# Patient Record
Sex: FEMALE | Race: WHITE | NOT HISPANIC OR LATINO | Employment: FULL TIME
[De-identification: names, ages, dates, MRNs, and addresses within clinical notes are randomized per-mention and may not be internally consistent; named-entity substitution may affect disease eponyms.]

---

## 2017-01-12 DIAGNOSIS — E11.65 TYPE 2 DIABETES MELLITUS WITH HYPERGLYCEMIA, WITHOUT LONG-TERM CURRENT USE OF INSULIN (H): Primary | ICD-10-CM

## 2017-01-12 NOTE — TELEPHONE ENCOUNTER
Routing refill request to provider for review/approval because:  Labs out of range:  A1c, was advised to be seen per letter.  No appts.

## 2017-01-12 NOTE — TELEPHONE ENCOUNTER
glipiZIDE         Last Written Prescription Date: 10/10/16  Last Fill Quantity: 270, # refills: 0  Last Office Visit with G, P or [facility] prescribing provider:  10/20/16      BP Readings from Last 3 Encounters:   10/20/16 114/72   03/04/16 104/72   02/02/15 122/62     MICROL        8   11/1/2016  No results found for this basename: microalbumin  CREATININE   Date Value Ref Range Status   11/01/2016 0.52 0.52 - 1.04 mg/dL Final   ]  GFR ESTIMATE   Date Value Ref Range Status   11/01/2016 >90  Non  GFR Calc   >60 mL/min/1.7m2 Final   03/04/2016 >90  Non  GFR Calc   >60 mL/min/1.7m2 Final   02/04/2015 >90  Non  GFR Calc   >60 mL/min/1.7m2 Final     GFR ESTIMATE IF BLACK   Date Value Ref Range Status   11/01/2016 >90   GFR Calc   >60 mL/min/1.7m2 Final   03/04/2016 >90   GFR Calc   >60 mL/min/1.7m2 Final   02/04/2015 >90   GFR Calc   >60 mL/min/1.7m2 Final     CHOL      205   11/1/2016  HDL       32   11/1/2016  LDL      116   11/1/2016  TRIG      283   11/1/2016  CHOLHDLRATIO      5.0   2/4/2015  AST       11   11/1/2016  ALT       15   11/1/2016  A1C     10.8   11/1/2016  A1C     12.6   3/4/2016  A1C     10.6   2/4/2015  A1C     13.3   5/7/2014  A1C     11.7   4/4/2013  POTASSIUM   Date Value Ref Range Status   11/01/2016 4.3 3.4 - 5.3 mmol/L Final

## 2017-01-13 RX ORDER — GLIPIZIDE 5 MG/1
TABLET ORAL
Qty: 270 TABLET | Refills: 0 | Status: SHIPPED | OUTPATIENT
Start: 2017-01-13 | End: 2017-04-20

## 2017-04-20 DIAGNOSIS — E11.65 TYPE 2 DIABETES MELLITUS WITH HYPERGLYCEMIA, WITHOUT LONG-TERM CURRENT USE OF INSULIN (H): ICD-10-CM

## 2017-04-20 RX ORDER — GLIPIZIDE 5 MG/1
TABLET ORAL
Qty: 270 TABLET | Refills: 0 | Status: SHIPPED | OUTPATIENT
Start: 2017-04-20 | End: 2017-07-24

## 2017-04-20 NOTE — TELEPHONE ENCOUNTER
glipiZIDE (GLUCOTROL) 5 MG tablet         Last Written Prescription Date: 1/13/2017  Last Fill Quantity: 270, # refills: 0  Last Office Visit with [facility], [facility] or [facility] prescribing provider:  10/20/2016        BP Readings from Last 3 Encounters:   10/20/16 114/72   03/04/16 104/72   02/02/15 122/62     Lab Results   Component Value Date    MICROL 8 11/01/2016     Lab Results   Component Value Date    UMALCR 5.71 11/01/2016     Creatinine   Date Value Ref Range Status   11/01/2016 0.52 0.52 - 1.04 mg/dL Final   ]  GFR Estimate   Date Value Ref Range Status   11/01/2016 >90  Non  GFR Calc   >60 mL/min/1.7m2 Final   03/04/2016 >90  Non  GFR Calc   >60 mL/min/1.7m2 Final   02/04/2015 >90  Non  GFR Calc   >60 mL/min/1.7m2 Final     GFR Estimate If Black   Date Value Ref Range Status   11/01/2016 >90   GFR Calc   >60 mL/min/1.7m2 Final   03/04/2016 >90   GFR Calc   >60 mL/min/1.7m2 Final   02/04/2015 >90   GFR Calc   >60 mL/min/1.7m2 Final     Lab Results   Component Value Date    CHOL 205 11/01/2016     Lab Results   Component Value Date    HDL 32 11/01/2016     Lab Results   Component Value Date     11/01/2016     Lab Results   Component Value Date    TRIG 283 11/01/2016     Lab Results   Component Value Date    CHOLHDLRATIO 5.0 02/04/2015     Lab Results   Component Value Date    AST 11 11/01/2016     Lab Results   Component Value Date    ALT 15 11/01/2016     Lab Results   Component Value Date    A1C 10.8 11/01/2016    A1C 12.6 03/04/2016    A1C 10.6 02/04/2015    A1C 13.3 05/07/2014    A1C 11.7 04/04/2013     Potassium   Date Value Ref Range Status   11/01/2016 4.3 3.4 - 5.3 mmol/L Final

## 2017-06-03 ENCOUNTER — HEALTH MAINTENANCE LETTER (OUTPATIENT)
Age: 37
End: 2017-06-03

## 2017-07-24 DIAGNOSIS — E11.65 TYPE 2 DIABETES MELLITUS WITH HYPERGLYCEMIA, WITHOUT LONG-TERM CURRENT USE OF INSULIN (H): ICD-10-CM

## 2017-07-25 NOTE — TELEPHONE ENCOUNTER
glipiZIDE (GLUCOTROL) 5 MG tablet         Last Written Prescription Date: 4/20/2017  Last Fill Quantity: 270, # refills: 0  Last Office Visit with [facility], [facility] or [facility] prescribing provider:  10/20/2016        BP Readings from Last 3 Encounters:   10/20/16 114/72   03/04/16 104/72   02/02/15 122/62     Lab Results   Component Value Date    MICROL 8 11/01/2016     Lab Results   Component Value Date    UMALCR 5.71 11/01/2016     Creatinine   Date Value Ref Range Status   11/01/2016 0.52 0.52 - 1.04 mg/dL Final   ]  GFR Estimate   Date Value Ref Range Status   11/01/2016 >90  Non  GFR Calc   >60 mL/min/1.7m2 Final   03/04/2016 >90  Non  GFR Calc   >60 mL/min/1.7m2 Final   02/04/2015 >90  Non  GFR Calc   >60 mL/min/1.7m2 Final     GFR Estimate If Black   Date Value Ref Range Status   11/01/2016 >90   GFR Calc   >60 mL/min/1.7m2 Final   03/04/2016 >90   GFR Calc   >60 mL/min/1.7m2 Final   02/04/2015 >90   GFR Calc   >60 mL/min/1.7m2 Final     Lab Results   Component Value Date    CHOL 205 11/01/2016     Lab Results   Component Value Date    HDL 32 11/01/2016     Lab Results   Component Value Date     11/01/2016     Lab Results   Component Value Date    TRIG 283 11/01/2016     Lab Results   Component Value Date    CHOLHDLRATIO 5.0 02/04/2015     Lab Results   Component Value Date    AST 11 11/01/2016     Lab Results   Component Value Date    ALT 15 11/01/2016     Lab Results   Component Value Date    A1C 10.8 11/01/2016    A1C 12.6 03/04/2016    A1C 10.6 02/04/2015    A1C 13.3 05/07/2014    A1C 11.7 04/04/2013     Potassium   Date Value Ref Range Status   11/01/2016 4.3 3.4 - 5.3 mmol/L Final

## 2017-07-25 NOTE — TELEPHONE ENCOUNTER
Routing refill request to provider for review/approval because:  Labs out of range:  LDL  Patient needs to be seen because:  More than 3 months since last office visit.  Last seen 10/2016 and based on HgbA1C was due 1/2017.

## 2017-07-28 RX ORDER — GLIPIZIDE 5 MG/1
TABLET ORAL
Qty: 270 TABLET | Refills: 0 | Status: SHIPPED | OUTPATIENT
Start: 2017-07-28 | End: 2019-05-29

## 2017-09-12 ENCOUNTER — TELEPHONE (OUTPATIENT)
Dept: INTERNAL MEDICINE | Facility: CLINIC | Age: 37
End: 2017-09-12

## 2017-09-12 NOTE — LETTER
[facility]  [address] [place] [address]  [phone]  September 12, 2017    Salima Woodward  [address] [facility] [address]    Dear Salima,    I care about your health and have reviewed your health plan. I have reviewed your medical conditions, medication list, and lab results and am making recommendations based on this review, to better manage your health.    You are in particular need of attention regarding:  -Diabetes  -Cervical Cancer Screening    I am recommending that you:     -schedule a FOLLOWUP OFFICE APPOINTMENT with me.      -schedule a PAP SMEAR EXAM which is due.  Please disregard this reminder if you have had this exam elsewhere within the last year.  It would be helpful for us to have a copy of your recent pap smear report in our file so that we can best coordinate your care.    If you are under/uninsured, we recommend you contact the [facility]. They offer pap smears at no charge or on a sliding fee charge. You can schedule with them at [phone]. Please have them send us the results.      Here is a list of Health Maintenance topics that are due now or due soon:  Health Maintenance Due   Topic Date Due     Eye Exam - yearly  02/22/1981     Pap Smear - every 3 years  02/22/2001     Diabetic Foot Exam - yearly  03/04/2017     A1C (Diabetes) Lab - every 6 months  05/01/2017     Flu Vaccine - yearly  09/01/2017       Please call us at [phone] or [phone] (or use Shout) to address the above recommendations.     Thank you for trusting [facility].  We appreciate the opportunity to serve you and look forward to supporting your healthcare needs in the future.    If you have (or plan to have) any of these tests done at a facility other than a [facility] or a [facility], please have the results from these tests sent to your primary physician at [facility]..    Healthy  Regards,    Ancelmo Holbrook MD

## 2017-11-30 ENCOUNTER — OFFICE VISIT (OUTPATIENT)
Dept: INTERNAL MEDICINE | Facility: CLINIC | Age: 37
End: 2017-11-30
Payer: COMMERCIAL

## 2017-11-30 VITALS
SYSTOLIC BLOOD PRESSURE: 114 MMHG | DIASTOLIC BLOOD PRESSURE: 70 MMHG | TEMPERATURE: 98.7 F | WEIGHT: 135 LBS | HEART RATE: 94 BPM | OXYGEN SATURATION: 96 % | BODY MASS INDEX: 24.3 KG/M2

## 2017-11-30 DIAGNOSIS — D35.2 PITUITARY MICROADENOMA (H): ICD-10-CM

## 2017-11-30 DIAGNOSIS — E11.65 TYPE 2 DIABETES MELLITUS WITH HYPERGLYCEMIA, WITHOUT LONG-TERM CURRENT USE OF INSULIN (H): ICD-10-CM

## 2017-11-30 DIAGNOSIS — Z23 NEED FOR PROPHYLACTIC VACCINATION AND INOCULATION AGAINST INFLUENZA: ICD-10-CM

## 2017-11-30 LAB
ALBUMIN SERPL-MCNC: 3.5 G/DL (ref 3.4–5)
ALP SERPL-CCNC: 92 U/L (ref 40–150)
ALT SERPL W P-5'-P-CCNC: 15 U/L (ref 0–50)
ANION GAP SERPL CALCULATED.3IONS-SCNC: 5 MMOL/L (ref 3–14)
AST SERPL W P-5'-P-CCNC: 20 U/L (ref 0–45)
BILIRUB SERPL-MCNC: 0.3 MG/DL (ref 0.2–1.3)
BUN SERPL-MCNC: 3 MG/DL (ref 7–30)
CALCIUM SERPL-MCNC: 9.2 MG/DL (ref 8.5–10.1)
CHLORIDE SERPL-SCNC: 108 MMOL/L (ref 94–109)
CHOLEST SERPL-MCNC: 192 MG/DL
CO2 SERPL-SCNC: 30 MMOL/L (ref 20–32)
CREAT SERPL-MCNC: 0.43 MG/DL (ref 0.52–1.04)
FSH SERPL-ACNC: 3.6 IU/L
GFR SERPL CREATININE-BSD FRML MDRD: >90 ML/MIN/1.7M2
GLUCOSE SERPL-MCNC: 211 MG/DL (ref 70–99)
HBA1C MFR BLD: 12.1 % (ref 4.3–6)
HDLC SERPL-MCNC: 33 MG/DL
LDLC SERPL CALC-MCNC: 111 MG/DL
LH SERPL-ACNC: 2.1 IU/L
NONHDLC SERPL-MCNC: 159 MG/DL
POTASSIUM SERPL-SCNC: 4.1 MMOL/L (ref 3.4–5.3)
PROLACTIN SERPL-MCNC: 2 UG/L (ref 3–27)
PROT SERPL-MCNC: 7.6 G/DL (ref 6.8–8.8)
SODIUM SERPL-SCNC: 143 MMOL/L (ref 133–144)
TRIGL SERPL-MCNC: 240 MG/DL
TSH SERPL DL<=0.005 MIU/L-ACNC: 1.38 MU/L (ref 0.4–4)

## 2017-11-30 PROCEDURE — 36415 COLL VENOUS BLD VENIPUNCTURE: CPT | Performed by: INTERNAL MEDICINE

## 2017-11-30 PROCEDURE — 99214 OFFICE O/P EST MOD 30 MIN: CPT | Mod: 25 | Performed by: INTERNAL MEDICINE

## 2017-11-30 PROCEDURE — 90686 IIV4 VACC NO PRSV 0.5 ML IM: CPT | Performed by: INTERNAL MEDICINE

## 2017-11-30 PROCEDURE — 90471 IMMUNIZATION ADMIN: CPT | Performed by: INTERNAL MEDICINE

## 2017-11-30 PROCEDURE — 80061 LIPID PANEL: CPT | Performed by: INTERNAL MEDICINE

## 2017-11-30 PROCEDURE — 84146 ASSAY OF PROLACTIN: CPT | Performed by: INTERNAL MEDICINE

## 2017-11-30 PROCEDURE — 83002 ASSAY OF GONADOTROPIN (LH): CPT | Performed by: INTERNAL MEDICINE

## 2017-11-30 PROCEDURE — 83001 ASSAY OF GONADOTROPIN (FSH): CPT | Performed by: INTERNAL MEDICINE

## 2017-11-30 PROCEDURE — 83036 HEMOGLOBIN GLYCOSYLATED A1C: CPT | Performed by: INTERNAL MEDICINE

## 2017-11-30 PROCEDURE — 82043 UR ALBUMIN QUANTITATIVE: CPT | Performed by: INTERNAL MEDICINE

## 2017-11-30 PROCEDURE — 80053 COMPREHEN METABOLIC PANEL: CPT | Performed by: INTERNAL MEDICINE

## 2017-11-30 PROCEDURE — 84443 ASSAY THYROID STIM HORMONE: CPT | Performed by: INTERNAL MEDICINE

## 2017-11-30 RX ORDER — GLIMEPIRIDE 4 MG/1
TABLET ORAL
Qty: 180 TABLET | Refills: 1 | Status: SHIPPED | OUTPATIENT
Start: 2017-11-30 | End: 2018-05-29

## 2017-11-30 NOTE — NURSING NOTE
"Chief Complaint   Patient presents with     Diabetes       Initial /70  Pulse 94  Temp 98.7  F (37.1  C) (Oral)  Wt 135 lb (61.2 kg)  SpO2 96%  BMI 24.3 kg/m2 Estimated body mass index is 24.3 kg/(m^2) as calculated from the following:    Height as of 3/4/16: 5' 2.5\" (1.588 m).    Weight as of this encounter: 135 lb (61.2 kg).  Medication Reconciliation: complete  "

## 2017-11-30 NOTE — MR AVS SNAPSHOT
After Visit Summary   11/30/2017    Salima Woodward    MRN: [id number]           Patient Information     Date Of Birth          1980        Visit Information        Provider Department      11/30/2017 3:00 PM Erlin Maldonado MD [facility]        Today's Diagnoses     Type 2 diabetes mellitus with hyperglycemia, without long-term current use of insulin (H)        Pituitary microadenoma (H)        Need for prophylactic vaccination and inoculation against influenza          Care Instructions     Stop Glipizide  Start Glimepiride 4mg tab, 2 tabs daily in AM with food for diabetes. If low blood sugars, then reduce to 1 tab daily in AM  Reduce carbohydrate (sugars, starchy foods such as bread, rice, potato, pasta, etc) intake  in your diet and increase the amount of color on your plate with fruits, vegetables and lean meats.   Eye exam  Flu vaccine   Would recommend pneumoccal vaccine at next appt.  Check blood sugars twice a day (before breakfast and bedtime) for 4 days and Email results to Dr Holbrook in 3 weeks and whether taking one or two tabs of Glimepiride             Follow-ups after your visit        Who to contact     If you have questions or need follow up information about today's clinic visit or your schedule please contact [facility] directly at [phone].  Normal or non-critical lab and imaging results will be communicated to you by MyChart, letter or phone within 4 business days after the clinic has received the results. If you do not hear from us within 7 days, please contact the clinic through MyChart or phone. If you have a critical or abnormal lab result, we will notify you by phone as soon as possible.  Submit refill requests through [employer] or call your pharmacy and they will forward the refill request to us. Please allow 3 business days for your refill to be completed.          Additional Information About Your Visit         [employer] Information     [employer] gives you secure access to your electronic health record. If you see a primary care provider, you can also send messages to your care team and make appointments. If you have questions, please call your primary care clinic.  If you do not have a primary care provider, please call [phone] and they will assist you.        Care EveryWhere ID     This is your Care EveryWhere ID. This could be used by other organizations to access your [place] medical records  [id number]        Your Vitals Were     Pulse Temperature Pulse Oximetry BMI (Body Mass Index)          94 98.7  F (37.1  C) (Oral) 96% 24.3 kg/m2         Blood Pressure from Last 3 Encounters:   11/30/17 114/70   10/20/16 114/72   03/04/16 104/72    Weight from Last 3 Encounters:   11/30/17 135 lb (61.2 kg)   10/20/16 138 lb (62.6 kg)   03/04/16 137 lb (62.1 kg)              We Performed the Following     Albumin Random Urine Quantitative with Creat Ratio     Comprehensive metabolic panel     FLU VAC, SPLIT VIRUS IM > 3 YO (QUADRIVALENT) [63274]     Follicle stimulating hormone     Hemoglobin A1c     Lipid panel reflex to direct LDL Fasting     Lutropin     Prolactin     TSH with free T4 reflex     Vaccine Administration, Initial [89858]          Today's Medication Changes          These changes are accurate as of: 11/30/17  8:42 PM.  If you have any questions, ask your nurse or doctor.               Start taking these medicines.        Dose/Directions    glimepiride 4 MG tablet   Commonly known as:  AMARYL   Used for:  Type 2 diabetes mellitus with hyperglycemia, without long-term current use of insulin (H)   Started by:  Erlin Maldonado MD        2 tabs in AM   Quantity:  180 tablet   Refills:  1         These medicines have changed or have updated prescriptions.        Dose/Directions    Calcium carb-Vitamin D 500 mg [place]-200 units 500-200 MG-UNIT per tablet   Commonly known as:  OSCAL with D;Oyster Shell Calcium   This may  have changed:  Another medication with the same name was removed. Continue taking this medication, and follow the directions you see here.   Changed by:  Erlin Maldonado MD        Dose:  1 tablet   Take 1 tablet by mouth 2 times daily (with meals)   Refills:  0            Where to get your medicines      These medications were sent to [facility] [address] [place] [address] [address] [facility]  [address] [facility] [address]     Phone:  [phone]     glimepiride 4 MG tablet                Primary Care Provider Office Phone # Fax #    Samm Ancelmo Holbrook [phone]       [address] [place] [id number]        Goals        General    I will  test my blood sugars twice a  day in the morning and evening  (pt-stated)     Notes - Note created  5/29/2014 10:05 AM by Yisel Iqbal, RN    As of today's date 5/29/2014 goal is met at 0 - 25%.   Goal Status:  Active      I will call [employer] and apply for health insurance  (pt-stated)     Notes - Note created  5/7/2014  9:51 AM by Yisel Iqbal, RN    As of today's date 5/7/2014 goal is met at 0 - 25%.   Goal Status:  Active      I will call [employer]/ Prescription Assistance Program and check on medication cost assistance  (pt-stated)     Notes - Note created  5/7/2014  9:55 AM by Yisel Iqbal, RN    As of today's date 5/7/2014 goal is met at 0 - 25%.   Goal Status:  Active        Equal Access to Services     [facility] AH: Hadii aad ku hadasho Soomaali, waaxda luqadaha, qaybta kaalmada adeegyada, waxay marlene urena . So [facility] [phone].    ATENCIÓN: Si habla español, tiene a call disposición servicios gratuitos de asistencia lingüística. Llame al [phone].    We comply with applicable federal civil rights laws and [place] laws. We do not discriminate on the basis of race, color, national origin, age, disability, sex, sexual orientation, or gender identity.            Thank you!      Thank you for choosing [facility]  for your care. Our goal is always to provide you with excellent care. Hearing back from our patients is one way we can continue to improve our services. Please take a few minutes to complete the written survey that you may receive in the mail after your visit with us. Thank you!             Your Updated Medication List - Protect others around you: Learn how to safely use, store and throw away your medicines at www.disposemymeds.org.          This list is accurate as of: 11/30/17  8:42 PM.  Always use your most recent med list.                   Brand Name Dispense Instructions for use Diagnosis    blood glucose monitoring test strip    EMILEE CONTOUR    100 each    Use to test blood sugars two times daily or as directed.    Type 2 diabetes mellitus with hyperglycemia, without long-term current use of insulin (H)       Calcium carb-Vitamin D 500 mg [place]-200 units 500-200 MG-UNIT per tablet    OSCAL with D;Oyster Shell Calcium     Take 1 tablet by mouth 2 times daily (with meals)        DAILY MULTIVITAMIN PO      Take  by mouth.        EPINEPHrine 0.3 MG/0.3ML injection 2-pack    EPIPEN/ADRENACLICK/or ANY BX GENERIC EQUIV    0.6 mL    Inject 0.3 mLs (0.3 mg) into the muscle once as needed    Type 2 diabetes mellitus with hyperglycemia, without long-term current use of insulin (H)       glimepiride 4 MG tablet    AMARYL    180 tablet    2 tabs in AM    Type 2 diabetes mellitus with hyperglycemia, without long-term current use of insulin (H)       glipiZIDE 5 MG tablet    GLUCOTROL    270 tablet    TAKE ONE & ONE-HALF TABLETS BY MOUTH TWICE DAILY    Type 2 diabetes mellitus with hyperglycemia, without long-term current use of insulin (H)       ibuprofen 800 MG tablet    ADVIL/MOTRIN    90 tablet    Take 1 tablet (800 mg) by mouth every 8 hours as needed for moderate pain    Other type of nonintractable migraine       MICROLET LANCETS Misc     100 each    1 each  4 times daily.    Type 2 diabetes, HbA1c goal < 7% (H)       order for DME     50 strip    Equipment being ordered:  Test strips for Lisa Contour EZ glucometer    Type 2 diabetes mellitus with hyperglycemia (H)

## 2017-11-30 NOTE — PATIENT INSTRUCTIONS
Stop Glipizide  Start Glimepiride 4mg tab, 2 tabs daily in AM with food for diabetes. If low blood sugars, then reduce to 1 tab daily in AM  Reduce carbohydrate (sugars, starchy foods such as bread, rice, potato, pasta, etc) intake  in your diet and increase the amount of color on your plate with fruits, vegetables and lean meats.   Eye exam  Flu vaccine   Would recommend pneumoccal vaccine at next appt.  Check blood sugars twice a day (before breakfast and bedtime) for 4 days and Email results to Dr Holbrook in 3 weeks and whether taking one or two tabs of Glimepiride

## 2017-11-30 NOTE — PROGRESS NOTES
SUBJECTIVE:   Salima Woodward is a 37 year old female who presents to clinic today for the following health issues:    Chief Complaint   Patient presents with     Diabetes       Diabetes Follow-up      Patient is checking blood sugars: rarely.  Results range from 80's to 300's    Diabetic concerns: None     Symptoms of hypoglycemia (low blood sugar): none     Paresthesias (numbness or burning in feet) or sores: No     Date of last diabetic eye exam: Due for Eye exam        Amount of exercise or physical activity: 4-5 days/week for an average of less than 15 minutes    Problems taking medications regularly: No    Medication side effects: none    Diet: diabetic        Pt's past medical history, family history, habits, medications and allergies were reviewed with the patient today.  See snap shot for  HCM status. Most recent lab results reviewed with pt. Problem list and histories reviewed & adjusted, as indicated.  Additional history as below:     Normally followed by  Dr Holbrook though not seen in 1 year. Missing doses of Glipizide often and usually taking  med only onc a day on average Some AM and some PM doses missed but more in the PM. Relates due to stress since August.  Works doing home care. Does not wish to discuss details of her stresses but denies actual depression. No suicidal ideation. Talks with friends. Declines therapist.   Denies CP, SOB, abdominal pain, polyuria, polydipsia, vision changes, extremity numbness/parasthesias or skin problems. Due for eye exam  Sugar 217 last night. Was 100 one week ago after minimal eating. 10 see a lot of bread and potatoes. Minimal free sugar. States she saw diabetic education once in the distant past and did not find it helpful and is therefore hesitant to do this again. Patient states her insurance coverage for medications is not very good unless  involves a cheap generic option. Has GI intolerance to Actos and metformin. Weight down 3 pounds in a year. Last labs  "from a year ago reviewed. Diabetes poorly controlled at that time.  Review of chart shows history of a pituitary microadenoma. Labs from over year ago showed normal prolactin and low-normal FSH/LH. Dr Holbrook had discussed having the patient undergo an MRI of the brain  In the past but at that time the patient was hesitant and is still so. Will therefore first check labs to assess status og microadenoma functional status    Additional ROS:   Constitutional, HEENT, Cardiovascular, Pulmonary, GI and , Neuro, MSK and Psych review of systems/symptoms are otherwise negative or unchanged from previous, except as noted above.      OBJECTIVE:  /70  Pulse 94  Temp 98.7  F (37.1  C) (Oral)  Wt 135 lb (61.2 kg)  SpO2 96%  BMI 24.3 kg/m2   Estimated body mass index is 24.3 kg/(m^2) as calculated from the following:    Height as of 3/4/16: 5' 2.5\" (1.588 m).    Weight as of this encounter: 135 lb (61.2 kg).  Eye: PERRL, EOMI  HENT: ear canals and TM's normal and nose and mouth without ulcers or lesions   Neck: no adenopathy. Thyroid normal to palpation. No bruits  Pulm: Lungs clear to auscultation   CV: Regular rates and rhythm  GI: Soft, nontender, Normal active bowel sounds, No hepatosplenomegaly or masses palpable  Ext: Peripheral pulses intact. No edema. Normal diabetic foot exam  Neuro: Normal strength and tone, sensory exam grossly normal.  LTS intact  Gen: Slightly flattened affect. Normal dress and thought content. Eye contact    Assessment/Plan: (See plan discussion below for further details)  1. Type 2 diabetes mellitus with hyperglycemia, without long-term current use of insulin  Poor controlled diabetes previously. Compliance with medication and diet for her. Medically, patient would likely do best with genu vera or other normal medications which would not put patient at risk for weight gain but all the options that are generic besides sulfonylureas are those meds that the patient has not tolerated " before. Therefore will at least see if we can improve compliance by using glimepiride dosing daily in the morning with breakfast. Patient was instructed to try to improve diabetic diet and will send in some future blood sugars well on 8 mg a glimepiride daily in the morning to assess her response. We'll defer follow-up future labs to  Dr Holbrook  Glimepiride (AMARYL) 4 MG tablet; 2 tabs in AM  Dispense: 180 tablet; Refill: 1  - Hemoglobin A1c  - Comprehensive metabolic panel  - Lipid panel reflex to direct LDL Fasting  - Albumin Random Urine Quantitative with Creat Ratio    2. Pituitary microadenoma (H)  Labs as ordered to assess adenoma function. Possible future repeat MRI brain as recommended last year by Dr Holbrook. Pt somewhat hesitant so will first check labs and forward results on to Dr Holbrook to see alwo  - TSH with free T4 reflex  - Prolactin  - Follicle stimulating hormone  - Lutropin    3. Need for prophylactic vaccination and inoculation against influenza  - FLU VAC, SPLIT VIRUS IM > 3 YO (QUADRIVALENT) [71257]  - Vaccine Administration, Initial [74391]    Plan discussion:   Stop Glipizide  Start Glimepiride 4mg tab, 2 tabs daily in AM with food for diabetes. If low blood sugars, then reduce to 1 tab daily in AM  Reduce carbohydrate (sugars, starchy foods such as bread, rice, potato, pasta, etc) intake  in your diet and increase the amount of color on your plate with fruits, vegetables and lean meats.   Eye exam  Flu vaccine   Would recommend pneumoccal vaccine at next appt.  Check blood sugars twice a day (before breakfast and bedtime) for 4 days and Email results to Dr Holbrook in 3 weeks and whether taking one or two tabs of Glimepiride    Erlin Maldonado MD  Internal Medicine Department  [facility]          Injectable Influenza Immunization Documentation    1.  Is the person to be vaccinated sick today?   No    2. Does the person to be vaccinated have an allergy to a component   of the vaccine?    No  Egg Allergy Algorithm Link    3. Has the person to be vaccinated ever had a serious reaction   to influenza vaccine in the past?   No    4. Has the person to be vaccinated ever had Guillain-Barré syndrome?   No    Form completed by Ching Campa CMA

## 2017-12-01 LAB
CREAT UR-MCNC: 31 MG/DL
MICROALBUMIN UR-MCNC: <5 MG/L
MICROALBUMIN/CREAT UR: NORMAL MG/G CR (ref 0–25)

## 2018-01-13 ENCOUNTER — HOSPITAL ENCOUNTER (EMERGENCY)
Facility: CLINIC | Age: 38
Discharge: HOME | End: 2018-01-14
Attending: EMERGENCY MEDICINE | Admitting: EMERGENCY MEDICINE
Payer: COMMERCIAL

## 2018-01-13 VITALS
BODY MASS INDEX: 24.84 KG/M2 | TEMPERATURE: 98.9 F | HEIGHT: 62 IN | WEIGHT: 135 LBS | RESPIRATION RATE: 16 BRPM | SYSTOLIC BLOOD PRESSURE: 159 MMHG | OXYGEN SATURATION: 98 % | DIASTOLIC BLOOD PRESSURE: 81 MMHG

## 2018-01-13 DIAGNOSIS — M54.50 ACUTE BILATERAL LOW BACK PAIN WITHOUT SCIATICA: ICD-10-CM
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
## 2018-01-13 DIAGNOSIS — S30.0XXA CONTUSION OF SACRUM, INITIAL ENCOUNTER: ICD-10-CM

## 2018-01-13 PROCEDURE — 25000132 ZZH RX MED GY IP 250 OP 250 PS 637: Performed by: EMERGENCY MEDICINE

## 2018-01-13 PROCEDURE — 99284 EMERGENCY DEPT VISIT MOD MDM: CPT

## 2018-01-13 RX ORDER — CYCLOBENZAPRINE HCL 10 MG
10 TABLET ORAL ONCE
Status: COMPLETED | OUTPATIENT
Start: 2018-01-13 | End: 2018-01-13

## 2018-01-13 RX ORDER — LIDOCAINE 4 G/G
1 PATCH TOPICAL ONCE
Status: DISCONTINUED | OUTPATIENT
Start: 2018-01-13 | End: 2018-01-14 | Stop reason: HOSPADM

## 2018-01-13 RX ORDER — HYDROCODONE BITARTRATE AND ACETAMINOPHEN 5; 325 MG/1; MG/1
2 TABLET ORAL ONCE
Status: COMPLETED | OUTPATIENT
Start: 2018-01-13 | End: 2018-01-13

## 2018-01-13 RX ADMIN — LIDOCAINE 1 PATCH: 560 PATCH PERCUTANEOUS; TOPICAL; TRANSDERMAL at 23:55

## 2018-01-13 RX ADMIN — HYDROCODONE BITARTRATE AND ACETAMINOPHEN 2 TABLET: 5; 325 TABLET ORAL at 23:55

## 2018-01-13 RX ADMIN — CYCLOBENZAPRINE HYDROCHLORIDE 10 MG: 10 TABLET, FILM COATED ORAL at 23:55

## 2018-01-13 NOTE — ED AVS SNAPSHOT
Emergency Department    [address] [facility] [address]    Phone:  [phone]    Fax:  [phone]                                       Salima Woodward   MRN: [id number]    Department:   Emergency Department   Date of Visit:  1/13/2018           Patient Information     Date Of Birth          1980        Your diagnoses for this visit were:     Fall, initial encounter     Acute bilateral low back pain without sciatica     Contusion of sacrum, initial encounter        You were seen by Stiven Wagn MD.      Follow-up Information     Follow up with Samm Holbrook MD. Schedule an appointment as soon as possible for a visit in 1 week.    Specialty:  Internal Medicine    Contact information:    [address] [place] [id number]  [phone]          Follow up with  Emergency Department.    Specialty:  EMERGENCY MEDICINE    Why:  If symptoms worsen    Contact information:    [address] [facility] [id number] [phone]        Follow up with your spine doctor at [facility] In 1 week.    Why:  As needed        Discharge Instructions         General Neck and Back Pain    Both neck and back pain are usually caused by injury to the muscles or ligaments of the spine. Sometimes the disks that separate each bone of the spine may cause pain by pressing on a nearby nerve. Back and neck pain may appear after a sudden twisting or bending force (such as in a car accident), or sometimes after a simple awkward movement. In either case, muscle spasm is often present and adds to the pain.  Acute neck and back pain usually gets better in 1 to 2 weeks. Pain related to disk disease, arthritis in the spinal joints or spinal stenosis (narrowing of the spinal canal) can become chronic and last for months or years.  Back and neck pain are common problems. Most people feel better in 1 or 2 weeks, and most of the rest in 1 to 2 months. Most people can remain active.  People  experience and describe pain differently.    Pain can be sharp, stabbing, shooting, aching, cramping, or burning    Movement, standing, bending, lifting, sitting, or walking may worsen the pain    Pain can be localized to one spot or area, or it can be more generalized    Pain can spread or radiate upwards, downwards, to the front, or go down your arms    Muscle spasm may occur.  Most of the time mechanical problems with the muscles or spine cause the pain. it is usually caused by an injury, whether known or not, to the muscles or ligaments. While illnesses can cause back pain, it is usually not caused by a serious illness. Pain is usually related to physical activity, whether sports, exercise, work, or normal activity. Sometimes it can occur without an identifiable cause. This can happen simply by stretching or moving wrong, without noting pain at the time. Other causes include:    Overexertion, lifting, pushing, pulling incorrectly or too aggressively.    Sudden twisting, bending or stretching from an accident (car or fall), or accidental movement.    Poor posture    Poor conditioning, lack of regular exercise    Spinal disc disease or arthritis    Stress    Pregnancy, or illness like appendicitis, bladder or kidney infection, pelvic infections   Home care    For neck pain: Use a comfortable pillow that supports the head and keeps the spine in a neutral position. The position of the head should not be tilted forward or backward.    When in bed, try to find a position of comfort. A firm mattress is best. Try lying flat on your back with pillows under your knees. You can also try lying on your side with your knees bent up towards your chest and a pillow between your knees.    At first, do not try to stretch out the sore spots. If there is a strain, it is not like the good soreness you get after exercising without an injury. In this case, stretching may make it worse.    Avoid prolonged sitting, long car rides or  travel. This puts more stress on the lower back than standing or walking.    During the first 24 to 72 hours after an injury, apply an ice pack to the painful area for 20 minutes and then remove it for 20 minutes over a period of 60 to 90 minutes or several times a day.     You can alternate ice and heat therapies. Talk with your healthcare provider about the best treatment for your back or neck pain. As a safety precaution, do not use a heating pad at bedtime. Sleeping with a heating pad can lead to skin burns or tissue damage.    Therapeutic massage can help relax the back and neck muscles without stretching them.    Be aware of safe lifting methods and do not lift anything over 15 pounds until all the pain is gone.  Medications  Talk to your healthcare provider before using medicine, especially if you have other medical problems or are taking other medicines.    You may use over-the-counter medicine to control pain, unless another pain medicine was prescribed. If you have chronic conditions like diabetes, liver or kidney disease, stomach ulcers,  gastrointestinal bleeding, or are taking blood thinner medicines.    Be careful if you are given pain medicines, narcotics, or medicine for muscle spasm. They can cause drowsiness, and can affect your coordination, reflexes, and judgment. Do not drive or operate heavy machinery.  Follow-up care  Follow up with your healthcare provider, or as advised. Physical therapy or further tests may be needed.  If X-rays were taken, you will be notified of any new findings that may affect your care.  Call 911  Seek emergency medical care if any of the following occur:    Trouble breathing    Confusion    Very drowsy or trouble awakening    Fainting or loss of consciousness    Rapid or very slow heart rate    Loss of bowel or bladder control  When to seek medical advice  Call your healthcare provider right away if any of these occur:    Pain becomes worse or spreads into your arms or  legs    Weakness, numbness or pain in one or both arms or legs    Numbness in the groin area    Difficulty walking    Fever of 100.4 F (38 C) or higher, or as directed by your healthcare provider  Date Last Reviewed: 7/1/2016 2000-2017 [employer]. [address] [place] [address]. All rights reserved. This information is not intended as a substitute for professional medical care. Always follow your healthcare professional's instructions.          Back Contusion  You have a contusion to your back. A contusion is also called a bruise. There is swelling and some bleeding under the skin. The skin may be purplish. You may have muscle aching and stiffness in the area of the bruise. There are no broken bones.  Contusions heal on their own, without further treatment. However, pain and skin discoloration may take weeks to months to go away.   Home care    Rest. Avoid heavy lifting, strenuous exertion, or any activity that causes pain.    Ice the area to reduce pain and swelling. Put ice cubes in a plastic bag or use a cold pack. (Wrap the cold source in a thin towel. Don't place it directly on your skin.) Ice the injured area for 20 minutes every 1 to 2 hours the first day. Continue with ice packs 3 to 4 times a day for the next 2 days, then as needed for the relief of pain and swelling.    Take any prescribed pain medicine. If none was prescribed, take acetaminophen, ibuprofen, or naproxen to control pain, unless you have other medical conditions that prevent taking these medicines. If you are unsure about medicines, ask your healthcare provider before you leave the hospital.  Follow-up care  Follow up with your healthcare provider, or as directed. Call if you are not better in 1 to 2 weeks.  When to seek medical advice  Call your healthcare provider for any of the following:    New or worsening pain    Increased swelling around the bruise    Pain spreads to one or both legs    Weakness or  numbness in one or both legs     Loss of bowel or bladder control    Numbness in the groin or genital area    Fever of 100.4 F (38 C) or higher, or as directed by your healthcare provider  Date Last Reviewed: 7/1/2017 2000-2017 [employer]. [address] [place] [address]. All rights reserved. This information is not intended as a substitute for professional medical care. Always follow your healthcare professional's instructions.          Relieving Back Pain  Back pain is a common problem. You can strain back muscles by lifting too much weight or just by moving the wrong way. Back strain can be uncomfortable, even painful. And it can take weeks or months to improve. To help yourself feel better and prevent future back strains, try these tips.  Important Note: Do not give aspirin to children or teens without first discussing it with your healthcare provider.      ? Ice    Ice reduces muscle pain and swelling. It helps most during the first 24 to 48 hours after an injury.    Wrap an ice pack or a bag of frozen peas in a thin towel. (Never place ice directly on your skin.)    Place the ice where your back hurts the most.    Don t ice for more than 20 minutes at a time.    You can use ice several times a day.  ? Medicines  Over-the-counter pain relievers can include acetaminophen and anti-inflammatory medicines, which includes aspirin or ibuprofen. They can help ease discomfort. Some also reduce swelling.    Tell your healthcare provider about any medicines you are already taking.    Take medicines only as directed.  ? Heat  After the first 48 hours, heat can relax sore muscles and improve blood flow.    Try a warm bath or shower. Or use a heating pad set on low. To prevent a burn, keep a cloth between you and the heating pad.    Don t use a heating pad for more than 15 minutes at a time. Never sleep on a heating pad.  Date Last Reviewed: 9/1/2015 2000-2017 [employer] [address]  [place] [address]. All rights reserved. This information is not intended as a substitute for professional medical care. Always follow your healthcare professional's instructions.          24 Hour Appointment Hotline       To make an appointment at any [facility], call [phone] ([phone]). If you don't have a family doctor or clinic, we will help you find one. [facility] clinics are conveniently located to serve the needs of you and your family.             Review of your medicines      START taking        Dose / Directions Last dose taken    cyclobenzaprine 10 MG tablet   Commonly known as:  FLEXERIL   Dose:  10 mg   Quantity:  15 tablet        Take 1 tablet (10 mg) by mouth 3 times daily as needed for muscle spasms   Refills:  0        HYDROcodone-acetaminophen 5-325 MG per tablet   Commonly known as:  NORCO   Dose:  1-2 tablet   Quantity:  10 tablet        Take 1-2 tablets by mouth every 4 hours as needed   Refills:  0        lidocaine 5 % Patch   Commonly known as:  LIDODERM   Dose:  1 patch   Quantity:  10 patch        Place 1 patch onto the skin every 24 hours for 10 days   Refills:  0          CONTINUE these medicines which may have CHANGED, or have new prescriptions. If we are uncertain of the size of tablets/capsules you have at home, strength may be listed as something that might have changed.        Dose / Directions Last dose taken    ibuprofen 600 MG tablet   Commonly known as:  ADVIL/MOTRIN   Dose:  600 mg   What changed:    - medication strength  - how much to take  - when to take this   Quantity:  30 tablet        Take 1 tablet (600 mg) by mouth every 6 hours as needed for moderate pain   Refills:  0          Our records show that you are taking the medicines listed below. If these are incorrect, please call your family doctor or clinic.        Dose / Directions Last dose taken    blood glucose monitoring test strip   Commonly known as:  EMILEE CONTOUR   Quantity:   100 each        Use to test blood sugars two times daily or as directed.   Refills:  3        Calcium carb-Vitamin D 500 mg [place]-200 units 500-200 MG-UNIT per tablet   Commonly known as:  OSCAL with D;Oyster Shell Calcium   Dose:  1 tablet        Take 1 tablet by mouth 2 times daily (with meals)   Refills:  0        DAILY MULTIVITAMIN PO        Take  by mouth.   Refills:  0        EPINEPHrine 0.3 MG/0.3ML injection 2-pack   Commonly known as:  EPIPEN/ADRENACLICK/or ANY BX GENERIC EQUIV   Dose:  0.3 mg   Quantity:  0.6 mL        Inject 0.3 mLs (0.3 mg) into the muscle once as needed   Refills:  11        glimepiride 4 MG tablet   Commonly known as:  AMARYL   Quantity:  180 tablet        2 tabs in AM   Refills:  1        glipiZIDE 5 MG tablet   Commonly known as:  GLUCOTROL   Quantity:  270 tablet        TAKE ONE & ONE-HALF TABLETS BY MOUTH TWICE DAILY   Refills:  0        MICROLET LANCETS Misc   Dose:  1 each   Quantity:  100 each        1 each 4 times daily.   Refills:  prn        order for DME   Quantity:  50 strip        Equipment being ordered:  Test strips for Lisa Contour EZ glucometer   Refills:  11                Prescriptions were sent or printed at these locations (4 Prescriptions)                   Other Prescriptions                Printed at Department/Unit printer (4 of 4)         lidocaine (LIDODERM) 5 % Patch               ibuprofen (ADVIL/MOTRIN) 600 MG tablet               HYDROcodone-acetaminophen (NORCO) 5-325 MG per tablet               cyclobenzaprine (FLEXERIL) 10 MG tablet                Procedures and tests performed during your visit     Lumbar spine XR, 2-3 views    XR Sacrum and Coccyx 2 Views      Orders Needing Specimen Collection     None      Pending Results     No orders found for last 3 day(s).            Pending Culture Results     No orders found for last 3 day(s).            Pending Results Instructions     If you had any lab results that were not finalized at the time of your  Discharge, you can call the ED Lab Result RN at [phone]. You will be contacted by this team for any positive Lab results or changes in treatment. The nurses are available 7 days a week from 10A to 6:30P.  You can leave a message 24 hours per day and they will return your call.        Test Results From Your Hospital Stay        1/14/2018 12:31 AM      Narrative     XR LUMBAR SPINE 2-3 VIEWS 1/14/2018 12:27 AM    HISTORY: Pain after fall.    COMPARISON: None.    FINDINGS: Lumbar alignment is anatomic. Lumbar vertebral body heights  and disc spaces are preserved. There is chronic appearing compression  deformity at T12. No specific evidence of acute fracture.        Impression     IMPRESSION: No acute osseous abnormality.    MONIKA HALE MD         1/14/2018 12:32 AM      Narrative     XR SACRUM AND COCCYX 2 VW 1/14/2018 12:27 AM    HISTORY: Pain after fall.    COMPARISON: None.    FINDINGS: Sacrococcygeal alignment is anatomic. Pelvis is intact.  Sacroiliac joints are patent and symmetric.        Impression     IMPRESSION: No acute osseous abnormality.    MONIKA HALE MD                Clinical Quality Measure: Blood Pressure Screening     Your blood pressure was checked while you were in the emergency department today. The last reading we obtained was  BP: 159/81 . Please read the guidelines below about what these numbers mean and what you should do about them.  If your systolic blood pressure (the top number) is less than 120 and your diastolic blood pressure (the bottom number) is less than 80, then your blood pressure is normal. There is nothing more that you need to do about it.  If your systolic blood pressure (the top number) is 120-139 or your diastolic blood pressure (the bottom number) is 80-89, your blood pressure may be higher than it should be. You should have your blood pressure rechecked within a year by a primary care provider.  If your systolic blood pressure (the top number) is 140 or greater  or your diastolic blood pressure (the bottom number) is 90 or greater, you may have high blood pressure. High blood pressure is treatable, but if left untreated over time it can put you at risk for heart attack, stroke, or kidney failure. You should have your blood pressure rechecked by a primary care provider within the next 4 weeks.  If your provider in the emergency department today gave you specific instructions to follow-up with your doctor or provider even sooner than that, you should follow that instruction and not wait for up to 4 weeks for your follow-up visit.        Thank you for choosing [facility]       Thank you for choosing [facility] for your care. Our goal is always to provide you with excellent care. Hearing back from our patients is one way we can continue to improve our services. Please take a few minutes to complete the written survey that you may receive in the mail after you visit with us. Thank you!        [facility] Information     [facility] gives you secure access to your electronic health record. If you see a primary care provider, you can also send messages to your care team and make appointments. If you have questions, please call your primary care clinic.  If you do not have a primary care provider, please call [phone] and they will assist you.        Care EveryWhere ID     This is your Care EveryWhere ID. This could be used by other organizations to access your [facility] medical records  [id number]        Equal Access to Services     SHELLY GERBER : [place], waaxda luqadaha, qaybta kaalmada jerri, arnol parada. So [facility] [phone].    ATENCIÓN: Si habla español, tiene a call disposición servicios gratuitos de asistencia lingüística. Llame al [phone].    We comply with applicable federal civil rights laws and [place] laws. We do not discriminate on the basis of race, color, national origin, age, disability, sex, sexual  orientation, or gender identity.            After Visit Summary       This is your record. Keep this with you and show to your community pharmacist(s) and doctor(s) at your next visit.

## 2018-01-13 NOTE — ED AVS SNAPSHOT
Emergency Department    [address] [place] [address]    Phone:  [phone]    Fax:  [phone]                                       Salima Woodward   MRN: [id number]    Department:   Emergency Department   Date of Visit:  1/13/2018           After Visit Summary Signature Page     I have received my discharge instructions, and my questions have been answered. I have discussed any challenges I see with this plan with the nurse or doctor.    ..........................................................................................................................................  Patient/Patient Representative Signature      ..........................................................................................................................................  Patient Representative Print Name and Relationship to Patient    ..................................................               ................................................  Date                                            Time    ..........................................................................................................................................  Reviewed by Signature/Title    ...................................................              ..............................................  Date                                                            Time

## 2018-01-14 ENCOUNTER — APPOINTMENT (OUTPATIENT)
Dept: GENERAL RADIOLOGY | Facility: CLINIC | Age: 38
End: 2018-01-14
Attending: EMERGENCY MEDICINE
Payer: COMMERCIAL

## 2018-01-14 PROCEDURE — 72100 X-RAY EXAM L-S SPINE 2/3 VWS: CPT

## 2018-01-14 PROCEDURE — 72220 X-RAY EXAM SACRUM TAILBONE: CPT

## 2018-01-14 RX ORDER — HYDROCODONE BITARTRATE AND ACETAMINOPHEN 5; 325 MG/1; MG/1
1-2 TABLET ORAL EVERY 4 HOURS PRN
Qty: 10 TABLET | Refills: 0 | Status: SHIPPED | OUTPATIENT
Start: 2018-01-14 | End: 2019-09-05

## 2018-01-14 RX ORDER — CYCLOBENZAPRINE HCL 10 MG
10 TABLET ORAL 3 TIMES DAILY PRN
Qty: 15 TABLET | Refills: 0 | Status: SHIPPED | OUTPATIENT
Start: 2018-01-14 | End: 2019-09-05

## 2018-01-14 RX ORDER — LIDOCAINE 50 MG/G
1 PATCH TOPICAL EVERY 24 HOURS
Qty: 10 PATCH | Refills: 0 | Status: SHIPPED | OUTPATIENT
Start: 2018-01-14 | End: 2018-01-24

## 2018-01-14 RX ORDER — IBUPROFEN 600 MG/1
600 TABLET, FILM COATED ORAL EVERY 6 HOURS PRN
Qty: 30 TABLET | Refills: 0 | Status: SHIPPED | OUTPATIENT
Start: 2018-01-14 | End: 2019-09-05
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
## 2018-01-14 NOTE — DISCHARGE INSTRUCTIONS
General Neck and Back Pain    Both neck and back pain are usually caused by injury to the muscles or ligaments of the spine. Sometimes the disks that separate each bone of the spine may cause pain by pressing on a nearby nerve. Back and neck pain may appear after a sudden twisting or bending force (such as in a car accident), or sometimes after a simple awkward movement. In either case, muscle spasm is often present and adds to the pain.  Acute neck and back pain usually gets better in 1 to 2 weeks. Pain related to disk disease, arthritis in the spinal joints or spinal stenosis (narrowing of the spinal canal) can become chronic and last for months or years.  Back and neck pain are common problems. Most people feel better in 1 or 2 weeks, and most of the rest in 1 to 2 months. Most people can remain active.  People experience and describe pain differently.    Pain can be sharp, stabbing, shooting, aching, cramping, or burning    Movement, standing, bending, lifting, sitting, or walking may worsen the pain    Pain can be localized to one spot or area, or it can be more generalized    Pain can spread or radiate upwards, downwards, to the front, or go down your arms    Muscle spasm may occur.  Most of the time mechanical problems with the muscles or spine cause the pain. it is usually caused by an injury, whether known or not, to the muscles or ligaments. While illnesses can cause back pain, it is usually not caused by a serious illness. Pain is usually related to physical activity, whether sports, exercise, work, or normal activity. Sometimes it can occur without an identifiable cause. This can happen simply by stretching or moving wrong, without noting pain at the time. Other causes include:    Overexertion, lifting, pushing, pulling incorrectly or too aggressively.    Sudden twisting, bending or stretching from an accident (car or fall), or accidental movement.    Poor posture    Poor conditioning, lack of regular  exercise    Spinal disc disease or arthritis    Stress    Pregnancy, or illness like appendicitis, bladder or kidney infection, pelvic infections   Home care    For neck pain: Use a comfortable pillow that supports the head and keeps the spine in a neutral position. The position of the head should not be tilted forward or backward.    When in bed, try to find a position of comfort. A firm mattress is best. Try lying flat on your back with pillows under your knees. You can also try lying on your side with your knees bent up towards your chest and a pillow between your knees.    At first, do not try to stretch out the sore spots. If there is a strain, it is not like the good soreness you get after exercising without an injury. In this case, stretching may make it worse.    Avoid prolonged sitting, long car rides or travel. This puts more stress on the lower back than standing or walking.    During the first 24 to 72 hours after an injury, apply an ice pack to the painful area for 20 minutes and then remove it for 20 minutes over a period of 60 to 90 minutes or several times a day.     You can alternate ice and heat therapies. Talk with your healthcare provider about the best treatment for your back or neck pain. As a safety precaution, do not use a heating pad at bedtime. Sleeping with a heating pad can lead to skin burns or tissue damage.    Therapeutic massage can help relax the back and neck muscles without stretching them.    Be aware of safe lifting methods and do not lift anything over 15 pounds until all the pain is gone.  Medications  Talk to your healthcare provider before using medicine, especially if you have other medical problems or are taking other medicines.    You may use over-the-counter medicine to control pain, unless another pain medicine was prescribed. If you have chronic conditions like diabetes, liver or kidney disease, stomach ulcers,  gastrointestinal bleeding, or are taking blood thinner  medicines.    Be careful if you are given pain medicines, narcotics, or medicine for muscle spasm. They can cause drowsiness, and can affect your coordination, reflexes, and judgment. Do not drive or operate heavy machinery.  Follow-up care  Follow up with your healthcare provider, or as advised. Physical therapy or further tests may be needed.  If X-rays were taken, you will be notified of any new findings that may affect your care.  Call 911  Seek emergency medical care if any of the following occur:    Trouble breathing    Confusion    Very drowsy or trouble awakening    Fainting or loss of consciousness    Rapid or very slow heart rate    Loss of bowel or bladder control  When to seek medical advice  Call your healthcare provider right away if any of these occur:    Pain becomes worse or spreads into your arms or legs    Weakness, numbness or pain in one or both arms or legs    Numbness in the groin area    Difficulty walking    Fever of 100.4 F (38 C) or higher, or as directed by your healthcare provider  Date Last Reviewed: 7/1/2016 2000-2017 [employer]. [address] [place] [address]. All rights reserved. This information is not intended as a substitute for professional medical care. Always follow your healthcare professional's instructions.          Back Contusion  You have a contusion to your back. A contusion is also called a bruise. There is swelling and some bleeding under the skin. The skin may be purplish. You may have muscle aching and stiffness in the area of the bruise. There are no broken bones.  Contusions heal on their own, without further treatment. However, pain and skin discoloration may take weeks to months to go away.   Home care    Rest. Avoid heavy lifting, strenuous exertion, or any activity that causes pain.    Ice the area to reduce pain and swelling. Put ice cubes in a plastic bag or use a cold pack. (Wrap the cold source in a thin towel. Don't place it  directly on your skin.) Ice the injured area for 20 minutes every 1 to 2 hours the first day. Continue with ice packs 3 to 4 times a day for the next 2 days, then as needed for the relief of pain and swelling.    Take any prescribed pain medicine. If none was prescribed, take acetaminophen, ibuprofen, or naproxen to control pain, unless you have other medical conditions that prevent taking these medicines. If you are unsure about medicines, ask your healthcare provider before you leave the hospital.  Follow-up care  Follow up with your healthcare provider, or as directed. Call if you are not better in 1 to 2 weeks.  When to seek medical advice  Call your healthcare provider for any of the following:    New or worsening pain    Increased swelling around the bruise    Pain spreads to one or both legs    Weakness or numbness in one or both legs     Loss of bowel or bladder control    Numbness in the groin or genital area    Fever of 100.4 F (38 C) or higher, or as directed by your healthcare provider  Date Last Reviewed: 7/1/2017 2000-2017 [employer]. [address] [place] [address]. All rights reserved. This information is not intended as a substitute for professional medical care. Always follow your healthcare professional's instructions.          Relieving Back Pain  Back pain is a common problem. You can strain back muscles by lifting too much weight or just by moving the wrong way. Back strain can be uncomfortable, even painful. And it can take weeks or months to improve. To help yourself feel better and prevent future back strains, try these tips.  Important Note: Do not give aspirin to children or teens without first discussing it with your healthcare provider.      ? Ice    Ice reduces muscle pain and swelling. It helps most during the first 24 to 48 hours after an injury.    Wrap an ice pack or a bag of frozen peas in a thin towel. (Never place ice directly on your skin.)    Place  the ice where your back hurts the most.    Don t ice for more than 20 minutes at a time.    You can use ice several times a day.  ? Medicines  Over-the-counter pain relievers can include acetaminophen and anti-inflammatory medicines, which includes aspirin or ibuprofen. They can help ease discomfort. Some also reduce swelling.    Tell your healthcare provider about any medicines you are already taking.    Take medicines only as directed.  ? Heat  After the first 48 hours, heat can relax sore muscles and improve blood flow.    Try a warm bath or shower. Or use a heating pad set on low. To prevent a burn, keep a cloth between you and the heating pad.    Don t use a heating pad for more than 15 minutes at a time. Never sleep on a heating pad.  Date Last Reviewed: 9/1/2015 2000-2017 [employer]. [address] [place] [address], [place] [address]. All rights reserved. This information is not intended as a substitute for professional medical care. Always follow your healthcare professional's instructions.

## 2018-02-09 ENCOUNTER — TELEPHONE (OUTPATIENT)
Dept: INTERNAL MEDICINE | Facility: CLINIC | Age: 38
End: 2018-02-09

## 2018-02-09 DIAGNOSIS — N30.00 ACUTE CYSTITIS WITHOUT HEMATURIA: Primary | ICD-10-CM

## 2018-02-09 NOTE — TELEPHONE ENCOUNTER
Spoke with patient who states that she has been having urinary urgency and burning x 2 days.  Urine has also been cloudy and has a strong odor.  Writer offered appointment with PA today.  Patient declined stating she cannot afford an office visit at this time due to her recent hospital bills.  Patient is wondering if she could schedule an evisit through [facility] with PCP?  She would also like to schedule a lab appointment for tomorrow morning.  Writer unsure if this is possible being that lab results will come back on a Saturday.  PCP please advise.

## 2018-02-09 NOTE — TELEPHONE ENCOUNTER
Kenia is fine - tell her I will address the Evisit tomorrow after she leaves urine specimen.  Future order for UA placed in this encounter.

## 2018-02-09 NOTE — TELEPHONE ENCOUNTER
Reason for call:  Patient reporting a symptom    Symptom or request: burning sensation, odor, color cloudy, urgency    Duration (how long have symptoms been present): 2 days    Have you been treated for this before? Yes    Additional comments:     Phone Number patient can be reached at:  Other phone number:  [phone]    Best Time:  Any time asap    Can we leave a detailed message on this number:  YES    Call taken on 2/9/2018 at 1:51 PM by Aimee Bryan

## 2018-02-10 DIAGNOSIS — R39.9 SYMPTOMS INVOLVING URINARY SYSTEM: ICD-10-CM

## 2018-02-10 DIAGNOSIS — R82.90 NONSPECIFIC FINDING ON EXAMINATION OF URINE: Primary | ICD-10-CM

## 2018-02-10 LAB
ALBUMIN UR-MCNC: NEGATIVE MG/DL
APPEARANCE UR: ABNORMAL
BACTERIA #/AREA URNS HPF: ABNORMAL /HPF
BILIRUB UR QL STRIP: NEGATIVE
COLOR UR AUTO: YELLOW
GLUCOSE UR STRIP-MCNC: >=1000 MG/DL
HGB UR QL STRIP: ABNORMAL
KETONES UR STRIP-MCNC: NEGATIVE MG/DL
LEUKOCYTE ESTERASE UR QL STRIP: NEGATIVE
NITRATE UR QL: POSITIVE
PH UR STRIP: 5.5 PH (ref 5–7)
RBC #/AREA URNS AUTO: ABNORMAL /HPF
SOURCE: ABNORMAL
SP GR UR STRIP: 1.01 (ref 1–1.03)
UROBILINOGEN UR STRIP-ACNC: 0.2 EU/DL (ref 0.2–1)
WBC #/AREA URNS AUTO: ABNORMAL /HPF

## 2018-02-10 PROCEDURE — 87088 URINE BACTERIA CULTURE: CPT | Performed by: INTERNAL MEDICINE

## 2018-02-10 PROCEDURE — 81001 URINALYSIS AUTO W/SCOPE: CPT | Performed by: INTERNAL MEDICINE

## 2018-02-10 PROCEDURE — 87186 SC STD MICRODIL/AGAR DIL: CPT | Performed by: INTERNAL MEDICINE

## 2018-02-10 PROCEDURE — 87086 URINE CULTURE/COLONY COUNT: CPT | Performed by: INTERNAL MEDICINE

## 2018-02-12 LAB
BACTERIA SPEC CULT: ABNORMAL
SPECIMEN SOURCE: ABNORMAL

## 2018-02-12 RX ORDER — SULFAMETHOXAZOLE/TRIMETHOPRIM 800-160 MG
1 TABLET ORAL 2 TIMES DAILY
Qty: 6 TABLET | Refills: 0 | Status: SHIPPED | OUTPATIENT
Start: 2018-02-12 | End: 2018-02-15

## 2018-05-21 DIAGNOSIS — G43.901 MIGRAINE WITH STATUS MIGRAINOSUS, NOT INTRACTABLE, UNSPECIFIED MIGRAINE TYPE: Primary | ICD-10-CM

## 2018-05-21 RX ORDER — IBUPROFEN 800 MG/1
TABLET, FILM COATED ORAL
Qty: 90 TABLET | Refills: 1 | Status: SHIPPED | OUTPATIENT
Start: 2018-05-21 | End: 2020-08-14

## 2018-05-29 DIAGNOSIS — E11.65 TYPE 2 DIABETES MELLITUS WITH HYPERGLYCEMIA, WITHOUT LONG-TERM CURRENT USE OF INSULIN (H): ICD-10-CM

## 2018-05-29 NOTE — TELEPHONE ENCOUNTER
Base on need for visit and past creatinine being out of range will defer prescription request to the physician.Chandrika Cuevas RN

## 2018-05-30 RX ORDER — GLIMEPIRIDE 4 MG/1
TABLET ORAL
Qty: 180 TABLET | Refills: 1 | Status: SHIPPED | OUTPATIENT
Start: 2018-05-30 | End: 2019-01-14

## 2019-01-14 DIAGNOSIS — Z13.29 SCREENING FOR THYROID DISORDER: Primary | ICD-10-CM

## 2019-01-14 DIAGNOSIS — E78.5 HYPERLIPIDEMIA WITH TARGET LDL LESS THAN 100: ICD-10-CM

## 2019-01-14 DIAGNOSIS — E11.65 TYPE 2 DIABETES MELLITUS WITH HYPERGLYCEMIA, WITHOUT LONG-TERM CURRENT USE OF INSULIN (H): ICD-10-CM

## 2019-01-14 RX ORDER — GLIMEPIRIDE 4 MG/1
8 TABLET ORAL
Qty: 60 TABLET | Refills: 0 | Status: SHIPPED | OUTPATIENT
Start: 2019-01-14 | End: 2019-01-17

## 2019-01-14 NOTE — TELEPHONE ENCOUNTER
Medication is being filled for 1 time refill only due to:  Future labs ordered fasting labs. Pt has upcoming appt on 1/17/19

## 2019-01-14 NOTE — TELEPHONE ENCOUNTER
Requested Prescriptions   Pending Prescriptions Disp Refills     glimepiride (AMARYL) 4 MG tablet [Pharmacy Med Name: GLIMEPIRIDE 4MG     TAB]  Last Written Prescription Date:  05/30/2018  Last Fill Quantity: 180,  # refills: 01   Last Office Visit: 11/30/2017   Future Office Visit:    Next 5 appointments (look out 90 days)    Jan 17, 2019  8:20 AM CST  SHORT with Darrin Hobbs PA-C  [facility] ([facility] [address] [id number] [phone]          180 tablet 1     Sig: TAKE 2 TABLETS BY MOUTH ONCE DAILY IN THE MORNING    Sulfonylurea Agents Failed - 1/14/2019  1:06 PM       Failed - Blood pressure less than 140/90 in past 6 months    BP Readings from Last 3 Encounters:   01/13/18 159/81   11/30/17 114/70   10/20/16 114/72                Failed - Patient has documented LDL within the past 12 mos.    Recent Labs   Lab Test 11/30/17  1543   *            Failed - Patient has documented A1c within the specified period of time.    If HgbA1C is 8 or greater, it needs to be on file within the past 3 months.  If less than 8, must be on file within the past 6 months.     Recent Labs   Lab Test 11/30/17  1543   A1C 12.1*            Failed - Patient has a recent creatinine (normal) within the past 12 mos.    Recent Labs   Lab Test 11/30/17  1543   CR 0.43*            Passed - Patient has had a Microalbumin in the past 12 mos.    Recent Labs   Lab Test 11/30/17  1555   MICROL <5   UMALCR Unable to calculate due to low value            Passed - Medication is active on med list       Passed - Patient is age 18 or older       Passed - No active pregnancy on record       Passed - Patient has not had a positive pregnancy test within the past 12 mos.       Passed - Recent (6 mo) or future (30 days) visit within the authorizing provider's specialty    Patient had office visit in the last 6 months or has a visit in the next 30 days with  "authorizing provider or within the authorizing provider's specialty.  See \"Patient Info\" tab in inbasket, or \"Choose Columns\" in Meds & Orders section of the refill encounter.              "

## 2019-01-17 ENCOUNTER — OFFICE VISIT (OUTPATIENT)
Dept: INTERNAL MEDICINE | Facility: CLINIC | Age: 39
End: 2019-01-17
Payer: COMMERCIAL

## 2019-01-17 VITALS
BODY MASS INDEX: 25.21 KG/M2 | OXYGEN SATURATION: 98 % | SYSTOLIC BLOOD PRESSURE: 108 MMHG | TEMPERATURE: 98.4 F | WEIGHT: 137 LBS | HEART RATE: 90 BPM | HEIGHT: 62 IN | DIASTOLIC BLOOD PRESSURE: 64 MMHG | RESPIRATION RATE: 18 BRPM

## 2019-01-17 DIAGNOSIS — Z11.4 SCREENING FOR HIV (HUMAN IMMUNODEFICIENCY VIRUS): ICD-10-CM

## 2019-01-17 DIAGNOSIS — Z13.89 SCREENING FOR DIABETIC PERIPHERAL NEUROPATHY: ICD-10-CM

## 2019-01-17 DIAGNOSIS — Z23 NEED FOR PROPHYLACTIC VACCINATION AND INOCULATION AGAINST INFLUENZA: ICD-10-CM

## 2019-01-17 DIAGNOSIS — Z13.5 SCREENING FOR DIABETIC RETINOPATHY: ICD-10-CM

## 2019-01-17 DIAGNOSIS — E11.65 TYPE 2 DIABETES MELLITUS WITH HYPERGLYCEMIA, WITHOUT LONG-TERM CURRENT USE OF INSULIN (H): ICD-10-CM

## 2019-01-17 LAB
CHOLEST SERPL-MCNC: 201 MG/DL
CREAT SERPL-MCNC: 0.43 MG/DL (ref 0.52–1.04)
CREAT UR-MCNC: 40 MG/DL
GFR SERPL CREATININE-BSD FRML MDRD: >90 ML/MIN/{1.73_M2}
HBA1C MFR BLD: 12.8 % (ref 0–5.6)
HDLC SERPL-MCNC: 30 MG/DL
LDLC SERPL CALC-MCNC: ABNORMAL MG/DL
LDLC SERPL DIRECT ASSAY-MCNC: 105 MG/DL
MICROALBUMIN UR-MCNC: 6 MG/L
MICROALBUMIN/CREAT UR: 15.94 MG/G CR (ref 0–25)
NONHDLC SERPL-MCNC: 171 MG/DL
TRIGL SERPL-MCNC: 496 MG/DL

## 2019-01-17 PROCEDURE — 36415 COLL VENOUS BLD VENIPUNCTURE: CPT | Performed by: PHYSICIAN ASSISTANT

## 2019-01-17 PROCEDURE — 82565 ASSAY OF CREATININE: CPT | Performed by: PHYSICIAN ASSISTANT

## 2019-01-17 PROCEDURE — 83036 HEMOGLOBIN GLYCOSYLATED A1C: CPT | Performed by: PHYSICIAN ASSISTANT

## 2019-01-17 PROCEDURE — 82043 UR ALBUMIN QUANTITATIVE: CPT | Performed by: PHYSICIAN ASSISTANT

## 2019-01-17 PROCEDURE — 83721 ASSAY OF BLOOD LIPOPROTEIN: CPT | Mod: 59 | Performed by: PHYSICIAN ASSISTANT

## 2019-01-17 PROCEDURE — 80061 LIPID PANEL: CPT | Performed by: PHYSICIAN ASSISTANT

## 2019-01-17 PROCEDURE — 99214 OFFICE O/P EST MOD 30 MIN: CPT | Performed by: PHYSICIAN ASSISTANT

## 2019-01-17 PROCEDURE — 99207 C FOOT EXAM  NO CHARGE: CPT | Mod: 25 | Performed by: PHYSICIAN ASSISTANT

## 2019-01-17 RX ORDER — GLIMEPIRIDE 4 MG/1
8 TABLET ORAL
Qty: 60 TABLET | Refills: 0 | Status: SHIPPED | OUTPATIENT
Start: 2019-01-17 | End: 2019-02-18

## 2019-01-17 ASSESSMENT — MIFFLIN-ST. JEOR: SCORE: 1254.68

## 2019-01-17 NOTE — PROGRESS NOTES
"  SUBJECTIVE:   Salima Woodward is a 38 year old female who presents to clinic today for the following health issues:    Diabetes Follow-up      Patient is checking blood sugars: rarely.  Results range from 175 to 300    Diabetic concerns: blood sugar frequently over 200     Symptoms of hypoglycemia (low blood sugar): sweaty, fatigue - happens at nighttime      Paresthesias (numbness or burning in feet) or sores: No     Date of last diabetic eye exam: unknown    BP Readings from Last 2 Encounters:   01/17/19 108/64   01/13/18 159/81     Hemoglobin A1C (%)   Date Value   11/30/2017 12.1 (H)   11/01/2016 10.8 (H)     LDL Cholesterol Calculated (mg/dL)   Date Value   11/30/2017 111 (H)   11/01/2016 116 (H)       Diabetes Management Resources    Problem list and histories reviewed & adjusted, as indicated.  Additional history: Patient has an irregular eating and work schedule.  Concerned about weight gain.  Is open to review treatment option for tighter glucose control.     Reviewed and updated as needed this visit by clinical staff  Tobacco  Allergies  Meds  Med Hx  Surg Hx  Fam Hx  Soc Hx      ROS:  Constitutional, HEENT, cardiovascular, pulmonary, gi and gu systems are negative, except as otherwise noted.    OBJECTIVE:     /64   Pulse 90   Temp 98.4  F (36.9  C) (Oral)   Resp 18   Ht 1.575 m (5' 2\")   Wt 62.1 kg (137 lb)   SpO2 98%   Breastfeeding? No   BMI 25.06 kg/m    Body mass index is 25.06 kg/m .  GENERAL: healthy, alert and no distress  NECK: no adenopathy, no asymmetry, masses, or scars and thyroid normal to palpation  RESP: lungs clear to auscultation - no rales, rhonchi or wheezes  CV: regular rate and rhythm, normal S1 S2, no S3 or S4, no murmur, click or rub, no peripheral edema and peripheral pulses strong  MS: no gross musculoskeletal defects noted, no edema  Diabetic foot exam: normal DP and PT pulses, no trophic changes or ulcerative lesions and normal monofilament " exam    Diagnostic Test Results:  none     ASSESSMENT/PLAN:   1. Type 2 diabetes mellitus with hyperglycemia, without long-term current use of insulin (H)  - CREATININE  - HEMOGLOBIN A1C  - Lipid panel reflex to direct LDL Fasting  - Albumin Random Urine Quantitative with Creat Ratio  - glimepiride (AMARYL) 4 MG tablet; Take 2 tablets (8 mg) by mouth every morning (before breakfast) Due for fasting labs, and appt, prior to further refills.  Dispense: 60 tablet; Refill: 0    2. Screening for diabetic peripheral neuropathy  - FOOT EXAM  NO CHARGE [13414.283]    Use medication as directed.  Follow up on labs  Patient amenable to this follow up plan.     Darrin Hobbs PA-C  [facility]

## 2019-02-13 DIAGNOSIS — E11.65 TYPE 2 DIABETES MELLITUS WITH HYPERGLYCEMIA, WITHOUT LONG-TERM CURRENT USE OF INSULIN (H): ICD-10-CM

## 2019-02-14 NOTE — TELEPHONE ENCOUNTER
"Requested Prescriptions   Pending Prescriptions Disp Refills     glimepiride (AMARYL) 4 MG tablet  Last Written Prescription Date:  01/17/2019  Last Fill Quantity: 60,  # refills: 0   Last Office Visit: 1/17/2019   Future Office Visit:      60 tablet 0     Sig: Take 2 tablets (8 mg) by mouth every morning (before breakfast) Due for fasting labs, and appt, prior to further refills.    Sulfonylurea Agents Failed - 2/13/2019  3:30 PM       Failed - Patient has a recent creatinine (normal) within the past 12 mos.    Recent Labs   Lab Test 01/17/19  0859   CR 0.43*            Passed - Blood pressure less than 140/90 in past 6 months    BP Readings from Last 3 Encounters:   01/17/19 108/64   01/13/18 159/81   11/30/17 114/70                Passed - Patient has documented LDL within the past 12 mos.    Recent Labs   Lab Test 01/17/19  0859   LDL Cannot estimate LDL when triglyceride exceeds 400 mg/dL  105*            Passed - Patient has had a Microalbumin in the past 12 mos.    Recent Labs   Lab Test 01/17/19  0900   MICROL 6   UMALCR 15.94            Passed - Patient has documented A1c within the specified period of time.    If HgbA1C is 8 or greater, it needs to be on file within the past 3 months.  If less than 8, must be on file within the past 6 months.     Recent Labs   Lab Test 01/17/19  0859   A1C 12.8*            Passed - Medication is active on med list       Passed - Patient is age 18 or older       Passed - No active pregnancy on record       Passed - Patient has not had a positive pregnancy test within the past 12 mos.       Passed - Recent (6 mo) or future (30 days) visit within the authorizing provider's specialty    Patient had office visit in the last 6 months or has a visit in the next 30 days with authorizing provider or within the authorizing provider's specialty.  See \"Patient Info\" tab in inbasket, or \"Choose Columns\" in Meds & Orders section of the refill encounter.              "

## 2019-02-14 NOTE — TELEPHONE ENCOUNTER
Routing refill request to provider for review/approval because:  Labs out of range:  Creat, a1c

## 2019-02-18 DIAGNOSIS — E11.65 TYPE 2 DIABETES MELLITUS WITH HYPERGLYCEMIA, WITHOUT LONG-TERM CURRENT USE OF INSULIN (H): ICD-10-CM

## 2019-02-18 RX ORDER — GLIMEPIRIDE 4 MG/1
8 TABLET ORAL
Qty: 60 TABLET | Refills: 0 | Status: SHIPPED | OUTPATIENT
Start: 2019-02-18 | End: 2019-03-25

## 2019-02-19 RX ORDER — GLIMEPIRIDE 4 MG/1
TABLET ORAL
Qty: 180 TABLET | Refills: 1 | OUTPATIENT
Start: 2019-02-19

## 2019-03-11 ENCOUNTER — TELEPHONE (OUTPATIENT)
Dept: INTERNAL MEDICINE | Facility: CLINIC | Age: 39
End: 2019-03-11

## 2019-03-11 NOTE — TELEPHONE ENCOUNTER
Panel Management Review    Patient Active Problem List   Diagnosis     Mantoux: positive     Type 2 diabetes mellitus with hyperglycemia (H)     CARDIOVASCULAR SCREENING; LDL GOAL LESS THAN 100     Migraine     Pituitary microadenoma (H)     Health Care Home     Hyperlipidemia with target LDL less than 100       Patient has the following on her problem list: None      Composite cancer screening  Chart review shows that this patient is due/due soon for the following Pap Smear  Summary:    Patient is due/failing the following:   PAP    Action needed:   Patient needs office visit for PAP.    Type of outreach:    Phone, left message for patient to call back.     Questions for provider review:    None                                                                                                                                    Ching Campa CMA       Chart routed to Care Team .

## 2019-03-25 DIAGNOSIS — E11.65 TYPE 2 DIABETES MELLITUS WITH HYPERGLYCEMIA, WITHOUT LONG-TERM CURRENT USE OF INSULIN (H): ICD-10-CM

## 2019-03-25 NOTE — TELEPHONE ENCOUNTER
Routing refill request to provider for review/approval because:  Labs out of range:  CR.  A1c>8  Triage spoke with patient:    Will see if provider will ok for lab only and advise of refills needs. Patient states she is wondering if for a follow up she can complete a virtual visit with labs instead of an OV d/t financial hardship.    Please advise.    Bruna HICKS RN, BSN, PHN

## 2019-03-25 NOTE — TELEPHONE ENCOUNTER
Reason for Call:  Other call back    Detailed comments: pt wants to know why she did not get a 90 day supply of glimepiride, pt was told she had to be seen and pt came in and saw daniel on 1/17/19 and also had labs done. Pt wants a call back as to why she still needs to come again. Pt also said that she had only 1 pill left for today. Please call pt back.     Phone Number Patient can be reached at: Home number on file [phone] (home)    Best Time: anytime    Can we leave a detailed message on this number? YES    Call taken on 3/25/2019 at 12:29 PM by GEORGIA GARCIA

## 2019-03-25 NOTE — TELEPHONE ENCOUNTER
"Requested Prescriptions   Pending Prescriptions Disp Refills     glimepiride (AMARYL) 4 MG tablet 60 tablet 0     Sig: Take 2 tablets (8 mg) by mouth every morning (before breakfast) Due for fasting labs, and appt, prior to further refills.    Sulfonylurea Agents Failed - 3/25/2019 12:40 PM       Failed - Patient has a recent creatinine (normal) within the past 12 mos.    Recent Labs   Lab Test 01/17/19  0859   CR 0.43*            Passed - Blood pressure less than 140/90 in past 6 months    BP Readings from Last 3 Encounters:   01/17/19 108/64   01/13/18 159/81   11/30/17 114/70                Passed - Patient has documented LDL within the past 12 mos.    Recent Labs   Lab Test 01/17/19  0859   LDL Cannot estimate LDL when triglyceride exceeds 400 mg/dL  105*            Passed - Patient has had a Microalbumin in the past 12 mos.    Recent Labs   Lab Test 01/17/19  0900   MICROL 6   UMALCR 15.94            Passed - Patient has documented A1c within the specified period of time.    If HgbA1C is 8 or greater, it needs to be on file within the past 3 months.  If less than 8, must be on file within the past 6 months.     Recent Labs   Lab Test 01/17/19  0859   A1C 12.8*            Passed - Medication is active on med list       Passed - Patient is age 18 or older       Passed - No active pregnancy on record       Passed - Patient has not had a positive pregnancy test within the past 12 mos.       Passed - Recent (6 mo) or future (30 days) visit within the authorizing provider's specialty    Patient had office visit in the last 6 months or has a visit in the next 30 days with authorizing provider or within the authorizing provider's specialty.  See \"Patient Info\" tab in inbasket, or \"Choose Columns\" in Meds & Orders section of the refill encounter.            "

## 2019-03-26 RX ORDER — GLIMEPIRIDE 4 MG/1
8 TABLET ORAL
Qty: 60 TABLET | Refills: 0 | Status: SHIPPED | OUTPATIENT
Start: 2019-03-26 | End: 2019-06-06

## 2019-03-26 NOTE — TELEPHONE ENCOUNTER
Script approved, but really needs to see me in next 1-2 months, with repeat labs prior, for reassessment.  Future orders placed.

## 2019-05-29 DIAGNOSIS — E11.65 TYPE 2 DIABETES MELLITUS WITH HYPERGLYCEMIA, WITHOUT LONG-TERM CURRENT USE OF INSULIN (H): ICD-10-CM

## 2019-05-29 RX ORDER — GLIPIZIDE 5 MG/1
TABLET ORAL
Qty: 270 TABLET | Refills: 0 | Status: SHIPPED | OUTPATIENT
Start: 2019-05-29 | End: 2019-06-06

## 2019-05-29 NOTE — TELEPHONE ENCOUNTER
"Requested Prescriptions   Pending Prescriptions Disp Refills     glipiZIDE (GLUCOTROL) 5 MG tablet 270 tablet 0     Sig: TAKE ONE & ONE-HALF TABLETS BY MOUTH TWICE DAILY       Sulfonylurea Agents Failed - 5/29/2019 11:17 AM        Failed - Patient has documented A1c within the specified period of time.     If HgbA1C is 8 or greater, it needs to be on file within the past 3 months.  If less than 8, must be on file within the past 6 months.     Recent Labs   Lab Test 01/17/19  0859   A1C 12.8*             Failed - Patient has a recent creatinine (normal) within the past 12 mos.     Recent Labs   Lab Test 01/17/19  0859   CR 0.43*             Passed - Blood pressure less than 140/90 in past 6 months     BP Readings from Last 3 Encounters:   01/17/19 108/64   01/13/18 159/81   11/30/17 114/70                 Passed - Patient has documented LDL within the past 12 mos.     Recent Labs   Lab Test 01/17/19  0859   LDL Cannot estimate LDL when triglyceride exceeds 400 mg/dL  105*             Passed - Patient has had a Microalbumin in the past 15 mos.     Recent Labs   Lab Test 01/17/19  0900   MICROL 6   UMALCR 15.94             Passed - Medication is active on med list        Passed - Patient is age 18 or older        Passed - No active pregnancy on record        Passed - Patient has not had a positive pregnancy test within the past 12 mos.        Passed - Recent (6 mo) or future (30 days) visit within the authorizing provider's specialty     Patient had office visit in the last 6 months or has a visit in the next 30 days with authorizing provider or within the authorizing provider's specialty.  See \"Patient Info\" tab in inbasket, or \"Choose Columns\" in Meds & Orders section of the refill encounter.              Last Written Prescription Date:  3/26/2019  Last Fill Quantity: 60,  # refills: 0   Last office visit: 1/17/2019 with prescribing provider:  Samm Holbrook     Future Office Visit:   Next 5 appointments (look " out 90 days)    Jun 06, 2019  4:15 PM CDT  SHORT with Samm Holbrook MD  [facility] ([facility] [address] [address] [phone]           Routing refill request to provider for review/approval because:  Aneta given x1 and patient did not follow up, please advise  Labs out of range:  Cr  A1c not current  >8    Bruna HICKS RN, BSN, PHN

## 2019-05-31 DIAGNOSIS — Z13.29 SCREENING FOR THYROID DISORDER: ICD-10-CM

## 2019-05-31 DIAGNOSIS — E11.65 TYPE 2 DIABETES MELLITUS WITH HYPERGLYCEMIA, WITHOUT LONG-TERM CURRENT USE OF INSULIN (H): ICD-10-CM

## 2019-05-31 DIAGNOSIS — E78.5 HYPERLIPIDEMIA WITH TARGET LDL LESS THAN 100: ICD-10-CM

## 2019-05-31 LAB
ALBUMIN SERPL-MCNC: 3.3 G/DL (ref 3.4–5)
ALP SERPL-CCNC: 119 U/L (ref 40–150)
ALT SERPL W P-5'-P-CCNC: 22 U/L (ref 0–50)
ANION GAP SERPL CALCULATED.3IONS-SCNC: 6 MMOL/L (ref 3–14)
AST SERPL W P-5'-P-CCNC: 10 U/L (ref 0–45)
BILIRUB SERPL-MCNC: 0.4 MG/DL (ref 0.2–1.3)
BUN SERPL-MCNC: 9 MG/DL (ref 7–30)
CALCIUM SERPL-MCNC: 8.9 MG/DL (ref 8.5–10.1)
CHLORIDE SERPL-SCNC: 103 MMOL/L (ref 94–109)
CHOLEST SERPL-MCNC: 223 MG/DL
CO2 SERPL-SCNC: 27 MMOL/L (ref 20–32)
CREAT SERPL-MCNC: 0.5 MG/DL (ref 0.52–1.04)
GFR SERPL CREATININE-BSD FRML MDRD: >90 ML/MIN/{1.73_M2}
GLUCOSE SERPL-MCNC: 270 MG/DL (ref 70–99)
HBA1C MFR BLD: 13 % (ref 0–5.6)
HDLC SERPL-MCNC: 29 MG/DL
LDLC SERPL CALC-MCNC: ABNORMAL MG/DL
LDLC SERPL DIRECT ASSAY-MCNC: 119 MG/DL
NONHDLC SERPL-MCNC: 194 MG/DL
POTASSIUM SERPL-SCNC: 3.8 MMOL/L (ref 3.4–5.3)
PROT SERPL-MCNC: 7.5 G/DL (ref 6.8–8.8)
SODIUM SERPL-SCNC: 136 MMOL/L (ref 133–144)
TRIGL SERPL-MCNC: 483 MG/DL
TSH SERPL DL<=0.005 MIU/L-ACNC: 2.37 MU/L (ref 0.4–4)

## 2019-05-31 PROCEDURE — 84443 ASSAY THYROID STIM HORMONE: CPT | Performed by: INTERNAL MEDICINE

## 2019-05-31 PROCEDURE — 36415 COLL VENOUS BLD VENIPUNCTURE: CPT | Performed by: INTERNAL MEDICINE

## 2019-05-31 PROCEDURE — 80053 COMPREHEN METABOLIC PANEL: CPT | Performed by: INTERNAL MEDICINE

## 2019-05-31 PROCEDURE — 80061 LIPID PANEL: CPT | Performed by: INTERNAL MEDICINE

## 2019-05-31 PROCEDURE — 83036 HEMOGLOBIN GLYCOSYLATED A1C: CPT | Performed by: INTERNAL MEDICINE

## 2019-05-31 PROCEDURE — 83721 ASSAY OF BLOOD LIPOPROTEIN: CPT | Mod: 59 | Performed by: INTERNAL MEDICINE

## 2019-06-06 ENCOUNTER — OFFICE VISIT (OUTPATIENT)
Dept: INTERNAL MEDICINE | Facility: CLINIC | Age: 39
End: 2019-06-06
Payer: COMMERCIAL

## 2019-06-06 VITALS
DIASTOLIC BLOOD PRESSURE: 60 MMHG | BODY MASS INDEX: 25.04 KG/M2 | RESPIRATION RATE: 16 BRPM | TEMPERATURE: 99 F | SYSTOLIC BLOOD PRESSURE: 117 MMHG | WEIGHT: 136.9 LBS | HEART RATE: 102 BPM | OXYGEN SATURATION: 99 %

## 2019-06-06 DIAGNOSIS — E11.65 TYPE 2 DIABETES MELLITUS WITH HYPERGLYCEMIA, WITHOUT LONG-TERM CURRENT USE OF INSULIN (H): Primary | ICD-10-CM

## 2019-06-06 PROCEDURE — 99214 OFFICE O/P EST MOD 30 MIN: CPT | Performed by: INTERNAL MEDICINE

## 2019-06-06 RX ORDER — GLIMEPIRIDE 4 MG/1
8 TABLET ORAL
Qty: 60 TABLET | Refills: 11 | Status: SHIPPED | OUTPATIENT
Start: 2019-06-06 | End: 2020-10-08

## 2019-06-06 NOTE — PROGRESS NOTES
Subjective     Salima Woodward is a 39 year old female who presents to clinic today for the following health issues:    HPI   Diabetes Follow-up      How often are you checking your blood sugar? A few times a month    What time of day are you checking your blood sugars (select all that apply)?  Before and after meals    Have you had any blood sugars above 200?  Yes over 200 in the ams    Have you had any blood sugars below 70?  No    What symptoms do you notice when your blood sugar is low?  None    What concerns do you have today about your diabetes? None     Do you have any of these symptoms? (Select all that apply)  No numbness or tingling in feet.  No redness, sores or blisters on feet.  No complaints of excessive thirst.  No reports of blurry vision.  No significant changes to weight.     Have you had a diabetic eye exam in the last 12 months? No    BP Readings from Last 2 Encounters:   06/06/19 117/60   01/17/19 108/64     Hemoglobin A1C (%)   Date Value   05/31/2019 13.0 (H)   01/17/2019 12.8 (H)     LDL Cholesterol Calculated (mg/dL)   Date Value   05/31/2019     Cannot estimate LDL when triglyceride exceeds 400 mg/dL   01/17/2019     Cannot estimate LDL when triglyceride exceeds 400 mg/dL     LDL Cholesterol Direct (mg/dL)   Date Value   05/31/2019 119 (H)   01/17/2019 105 (H)       Diabetes Management Resources    Amount of exercise or physical activity: 2-3 days/week for an average of 45-60 minutes    Problems taking medications regularly: No    Medication side effects: none    Diet: diabetic                Reviewed and updated as needed this visit by Provider  Tobacco  Allergies  Meds  Problems  Med Hx  Surg Hx  Fam Hx         Review of Systems   ROS COMP: Constitutional, HEENT, cardiovascular, pulmonary, GI, , musculoskeletal, neuro, skin, endocrine and psych systems are negative, except as otherwise noted.      Objective    /60   Pulse 102   Temp 99  F (37.2  C) (Oral)   Resp 16    Wt 62.1 kg (136 lb 14.4 oz)   SpO2 99%   BMI 25.04 kg/m    Body mass index is 25.04 kg/m .  Physical Exam   GENERAL: healthy, alert and no distress  NECK: no adenopathy, no asymmetry, masses, or scars and thyroid normal to palpation  RESP: lungs clear to auscultation - no rales, rhonchi or wheezes  CV: regular rate and rhythm, normal S1 S2, no S3 or S4, no murmur, click or rub, no peripheral edema and peripheral pulses strong  ABDOMEN: soft, nontender, no hepatosplenomegaly, no masses and bowel sounds normal  MS: no gross musculoskeletal defects noted, no edema            Assessment & Plan     1. Type 2 diabetes mellitus with hyperglycemia, without long-term current use of insulin (H)  Suboptimally controlled.  Discussed options.  Will add Ozempic, and have patient return in 3 months with labs prior.  Continue glimepiride.  - glimepiride (AMARYL) 4 MG tablet; Take 2 tablets (8 mg) by mouth every morning (before breakfast) Due for fasting labs, and appt, prior to further refills.  Dispense: 60 tablet; Refill: 11  - Semaglutide (OZEMPIC) 0.25 or 0.5 MG/DOSE SOPN; Inject 0.25 mg Subcutaneous every 7 days  Dispense: 1.5 mL; Refill: 11  - insulin pen needle (31G X 5 MM) 31G X 5 MM miscellaneous; Use 1 pen needles weekly or as directed.  Dispense: 30 each; Refill: 3  - Hemoglobin A1c; Future  - Comprehensive metabolic panel; Future  - Albumin Random Urine Quantitative with Creat Ratio; Future           Tobacco Cessation:   reports that she has been smoking.  She has been smoking about 0.50 packs per day. She has never used smokeless tobacco.          See Patient Instructions    Return in about 3 months (around 9/6/2019) for Diabetes Check, with pre-visit non-fasting lab.    Samm Holbrook MD  [facility]

## 2019-06-06 NOTE — PATIENT INSTRUCTIONS
- Start taking Ozempic 0.25 mg once weekly.  Continue that dose for 4 weeks, then increase to 0.5 mg weekly.  (Prescription has been sent to your pharmacy)     - Continue the glimepiride as currently.    - Please follow-up with me in clinic in 3 months.  - Please come in for non-fasting labs, a few days prior to the appointment with me.

## 2019-06-07 ENCOUNTER — TELEPHONE (OUTPATIENT)
Dept: INTERNAL MEDICINE | Facility: CLINIC | Age: 39
End: 2019-06-07

## 2019-06-07 NOTE — TELEPHONE ENCOUNTER
Called [employer] and notified them of discontineud Glucatrol (glipiZIDE).    Per [employer] this medication was picked up on 5/31.      Call placed to patient and left message (per [facility]) to discontinue Glucatrol 5 mg and to take the Amaryl 4 mg tabs.

## 2019-06-07 NOTE — TELEPHONE ENCOUNTER
Reason for Call:  Medication or medication refill:    Do you use a [facility]?  Name of the pharmacy and phone number for the current request:  [facility] [phone]    Name of the medication requested:is glimepiride replacing glipiside? Pharmacy wants to know    Other request:     Can we leave a detailed message on this number?     Phone number patient can be reached at: Other phone number:  See above tele #    Best Time: asap    Call taken on 6/7/2019 at 10:34 AM by Aimee Bryan

## 2019-08-29 DIAGNOSIS — E11.65 TYPE 2 DIABETES MELLITUS WITH HYPERGLYCEMIA, WITHOUT LONG-TERM CURRENT USE OF INSULIN (H): ICD-10-CM

## 2019-08-29 LAB
ALBUMIN SERPL-MCNC: 3.3 G/DL (ref 3.4–5)
ALP SERPL-CCNC: 88 U/L (ref 40–150)
ALT SERPL W P-5'-P-CCNC: 13 U/L (ref 0–50)
ANION GAP SERPL CALCULATED.3IONS-SCNC: 6 MMOL/L (ref 3–14)
AST SERPL W P-5'-P-CCNC: 10 U/L (ref 0–45)
BILIRUB SERPL-MCNC: 0.2 MG/DL (ref 0.2–1.3)
BUN SERPL-MCNC: 8 MG/DL (ref 7–30)
CALCIUM SERPL-MCNC: 9.1 MG/DL (ref 8.5–10.1)
CHLORIDE SERPL-SCNC: 108 MMOL/L (ref 94–109)
CO2 SERPL-SCNC: 28 MMOL/L (ref 20–32)
CREAT SERPL-MCNC: 0.45 MG/DL (ref 0.52–1.04)
GFR SERPL CREATININE-BSD FRML MDRD: >90 ML/MIN/{1.73_M2}
GLUCOSE SERPL-MCNC: 145 MG/DL (ref 70–99)
HBA1C MFR BLD: 9.6 % (ref 0–5.6)
POTASSIUM SERPL-SCNC: 4.5 MMOL/L (ref 3.4–5.3)
PROT SERPL-MCNC: 7.4 G/DL (ref 6.8–8.8)
SODIUM SERPL-SCNC: 142 MMOL/L (ref 133–144)

## 2019-08-29 PROCEDURE — 36415 COLL VENOUS BLD VENIPUNCTURE: CPT | Performed by: INTERNAL MEDICINE

## 2019-08-29 PROCEDURE — 80053 COMPREHEN METABOLIC PANEL: CPT | Performed by: INTERNAL MEDICINE

## 2019-08-29 PROCEDURE — 83036 HEMOGLOBIN GLYCOSYLATED A1C: CPT | Performed by: INTERNAL MEDICINE

## 2019-09-05 ENCOUNTER — OFFICE VISIT (OUTPATIENT)
Dept: INTERNAL MEDICINE | Facility: CLINIC | Age: 39
End: 2019-09-05
Payer: COMMERCIAL

## 2019-09-05 VITALS
OXYGEN SATURATION: 96 % | SYSTOLIC BLOOD PRESSURE: 90 MMHG | WEIGHT: 135.2 LBS | RESPIRATION RATE: 16 BRPM | DIASTOLIC BLOOD PRESSURE: 60 MMHG | TEMPERATURE: 98.4 F | BODY MASS INDEX: 24.73 KG/M2 | HEART RATE: 113 BPM

## 2019-09-05 DIAGNOSIS — E11.65 TYPE 2 DIABETES MELLITUS WITH HYPERGLYCEMIA, WITHOUT LONG-TERM CURRENT USE OF INSULIN (H): Primary | ICD-10-CM

## 2019-09-05 PROCEDURE — 99214 OFFICE O/P EST MOD 30 MIN: CPT | Performed by: INTERNAL MEDICINE

## 2019-09-05 NOTE — PROGRESS NOTES
Subjective     Salima Woodward is a 39 year old female who presents to clinic today for the following health issues:    HPI   Diabetes Follow-up      How often are you checking your blood sugar? A few times a week    What time of day are you checking your blood sugars (select all that apply)?  Before and after meals    Have you had any blood sugars above 200?  Yes 224    Have you had any blood sugars below 70?  Yes 65    What symptoms do you notice when your blood sugar is low?  Shaky    What concerns do you have today about your diabetes? None     Do you have any of these symptoms? (Select all that apply)  No numbness or tingling in feet.  No redness, sores or blisters on feet.  No complaints of excessive thirst.  No reports of blurry vision.  No significant changes to weight.     Have you had a diabetic eye exam in the last 12 months? No    BP Readings from Last 2 Encounters:   09/05/19 90/60   06/06/19 117/60     Hemoglobin A1C (%)   Date Value   08/29/2019 9.6 (H)   05/31/2019 13.0 (H)     LDL Cholesterol Calculated (mg/dL)   Date Value   05/31/2019     Cannot estimate LDL when triglyceride exceeds 400 mg/dL   01/17/2019     Cannot estimate LDL when triglyceride exceeds 400 mg/dL     LDL Cholesterol Direct (mg/dL)   Date Value   05/31/2019 119 (H)   01/17/2019 105 (H)       Diabetes Management Resources      How many servings of fruits and vegetables do you eat daily?  0-1    On average, how many sweetened beverages do you drink each day (soda, juice, sweet tea, etc)?   0    How many days per week do you miss taking your medication? 0                  Reviewed and updated as needed this visit by Provider  Tobacco  Allergies  Meds  Problems  Med Hx  Surg Hx  Fam Hx         Review of Systems   ROS COMP: Constitutional, HEENT, cardiovascular, pulmonary, GI, , musculoskeletal, neuro, skin, endocrine and psych systems are negative, except as otherwise noted.      Objective    BP 90/60 (BP Location: Left  arm, Patient Position: Chair, Cuff Size: Adult Regular)   Pulse 113   Temp 98.4  F (36.9  C) (Oral)   Resp 16   Wt 61.3 kg (135 lb 3.2 oz)   SpO2 96%   BMI 24.73 kg/m    Body mass index is 24.73 kg/m .  Physical Exam   GENERAL: healthy, alert and no distress  NECK: no adenopathy, no asymmetry, masses, or scars and thyroid normal to palpation  RESP: lungs clear to auscultation - no rales, rhonchi or wheezes  CV: regular rate and rhythm, normal S1 S2, no S3 or S4, no murmur, click or rub, no peripheral edema and peripheral pulses strong  ABDOMEN: soft, nontender, no hepatosplenomegaly, no masses and bowel sounds normal  MS: no gross musculoskeletal defects noted, no edema            Assessment & Plan     1. Type 2 diabetes mellitus with hyperglycemia, without long-term current use of insulin (H)  Improved - increase Ozempic to 1 mg weekly, will re-check labs again in another 3 months  - semaglutide (OZEMPIC) 1 MG/DOSE pen; Inject 1 mg Subcutaneous every 7 days  Dispense: 3 mL; Refill: 11  - Hemoglobin A1c; Future  - Basic metabolic panel; Future     Tobacco Cessation:   reports that she has been smoking.  She has been smoking about 0.50 packs per day. She has never used smokeless tobacco.  Tobacco Cessation Action Plan: Information offered: Patient not interested at this time            Return in about 3 months (around 12/5/2019) for Fasting Lab Only Visit.    Samm Holbrook MD  [facility]

## 2019-09-05 NOTE — PATIENT INSTRUCTIONS
- Increase the Ozempic to 1.0 mg every 7 days.  (Prescription has been sent to your pharmacy)  - Continue all other medications as you have been.    - Please come in for fasting labs in 3 months.  I will be in touch with you when I receive the results.

## 2019-12-06 DIAGNOSIS — E11.65 TYPE 2 DIABETES MELLITUS WITH HYPERGLYCEMIA, WITHOUT LONG-TERM CURRENT USE OF INSULIN (H): ICD-10-CM

## 2019-12-06 LAB
ANION GAP SERPL CALCULATED.3IONS-SCNC: 3 MMOL/L (ref 3–14)
BUN SERPL-MCNC: 6 MG/DL (ref 7–30)
CALCIUM SERPL-MCNC: 9.2 MG/DL (ref 8.5–10.1)
CHLORIDE SERPL-SCNC: 108 MMOL/L (ref 94–109)
CO2 SERPL-SCNC: 28 MMOL/L (ref 20–32)
CREAT SERPL-MCNC: 0.55 MG/DL (ref 0.52–1.04)
CREAT UR-MCNC: 113 MG/DL
GFR SERPL CREATININE-BSD FRML MDRD: >90 ML/MIN/{1.73_M2}
GLUCOSE SERPL-MCNC: 120 MG/DL (ref 70–99)
HBA1C MFR BLD: 6.6 % (ref 0–5.6)
MICROALBUMIN UR-MCNC: 14 MG/L
MICROALBUMIN/CREAT UR: 12.65 MG/G CR (ref 0–25)
POTASSIUM SERPL-SCNC: 4.3 MMOL/L (ref 3.4–5.3)
SODIUM SERPL-SCNC: 139 MMOL/L (ref 133–144)

## 2019-12-06 PROCEDURE — 83036 HEMOGLOBIN GLYCOSYLATED A1C: CPT | Performed by: INTERNAL MEDICINE

## 2019-12-06 PROCEDURE — 80048 BASIC METABOLIC PNL TOTAL CA: CPT | Performed by: INTERNAL MEDICINE

## 2019-12-06 PROCEDURE — 36415 COLL VENOUS BLD VENIPUNCTURE: CPT | Performed by: INTERNAL MEDICINE

## 2019-12-06 PROCEDURE — 82043 UR ALBUMIN QUANTITATIVE: CPT | Performed by: INTERNAL MEDICINE

## 2019-12-09 ENCOUNTER — HEALTH MAINTENANCE LETTER (OUTPATIENT)
Age: 39
End: 2019-12-09

## 2020-03-15 ENCOUNTER — HEALTH MAINTENANCE LETTER (OUTPATIENT)
Age: 40
End: 2020-03-15

## 2020-08-13 DIAGNOSIS — G43.901 MIGRAINE WITH STATUS MIGRAINOSUS, NOT INTRACTABLE, UNSPECIFIED MIGRAINE TYPE: ICD-10-CM

## 2020-08-13 NOTE — TELEPHONE ENCOUNTER
IBU     Routing refill request to provider for review/approval because:  A break in medication  No CBC on file

## 2020-08-14 RX ORDER — IBUPROFEN 800 MG/1
TABLET, FILM COATED ORAL
Qty: 90 TABLET | Refills: 0 | Status: SHIPPED | OUTPATIENT
Start: 2020-08-14 | End: 2021-11-19

## 2020-08-17 DIAGNOSIS — E11.65 TYPE 2 DIABETES MELLITUS WITH HYPERGLYCEMIA, WITHOUT LONG-TERM CURRENT USE OF INSULIN (H): ICD-10-CM

## 2020-08-21 RX ORDER — SEMAGLUTIDE 1.34 MG/ML
INJECTION, SOLUTION SUBCUTANEOUS
Qty: 4 ML | Refills: 0 | Status: SHIPPED | OUTPATIENT
Start: 2020-08-21 | End: 2020-10-06

## 2020-10-06 ENCOUNTER — DOCUMENTATION ONLY (OUTPATIENT)
Dept: INTERNAL MEDICINE | Facility: CLINIC | Age: 40
End: 2020-10-06

## 2020-10-06 DIAGNOSIS — E11.65 TYPE 2 DIABETES MELLITUS WITH HYPERGLYCEMIA, WITHOUT LONG-TERM CURRENT USE OF INSULIN (H): ICD-10-CM

## 2020-10-06 DIAGNOSIS — E78.5 HYPERLIPIDEMIA WITH TARGET LDL LESS THAN 100: Primary | ICD-10-CM

## 2020-10-06 RX ORDER — SEMAGLUTIDE 1.34 MG/ML
INJECTION, SOLUTION SUBCUTANEOUS
Qty: 4 ML | Refills: 11 | Status: SHIPPED | OUTPATIENT
Start: 2020-10-06 | End: 2021-04-22

## 2020-10-06 NOTE — PROGRESS NOTES
Please place or confirm orders for upcoming lab appointment on 10/08/2020. Thank you.    If patient doesn't need any labs please advise and let patient know. Thank you.

## 2020-10-06 NOTE — TELEPHONE ENCOUNTER
"Requested Prescriptions   Pending Prescriptions Disp Refills     OZEMPIC (1 MG/DOSE) 2 MG/1.5ML pen [Pharmacy Med Name: Ozempic (1 MG/DOSE) 2 MG/1.5ML Subcutaneous Solution Pen-injector] 4 mL 0     Sig: INJECT 1 MG SUBCUTANEOUSLY EVERY 7 DAYS       GLP-1 Agonists Protocol Failed - 10/6/2020 11:55 AM        Failed - HgbA1C in past 3 or 6 months     If HgbA1C is 8 or greater, it needs to be on file within the past 3 months.  If less than 8, must be on file within the past 6 months.     Recent Labs   Lab Test 12/06/19  1029   A1C 6.6*             Failed - Recent (6 mo) or future (30 days) visit within the authorizing provider's specialty     Patient had office visit in the last 6 months or has a visit in the next 30 days with authorizing provider.  See \"Patient Info\" tab in inbasket, or \"Choose Columns\" in Meds & Orders section of the refill encounter.            Passed - Medication is active on med list        Passed - Patient is age 18 or older        Passed - No active pregnancy on record        Passed - Normal serum creatinine on file in past 12 months     Recent Labs   Lab Test 12/06/19  1029   CR 0.55       Ok to refill medication if creatinine is low          Passed - No positive pregnancy test in past 12 months             "

## 2020-10-06 NOTE — TELEPHONE ENCOUNTER
Pt has a VV this coming Thurs.   Requesting refill, and labs to be ordered, will be coming in Thurs morning before VV to get fasting labs.   Labs & RX pending for signature.

## 2020-10-08 ENCOUNTER — VIRTUAL VISIT (OUTPATIENT)
Dept: INTERNAL MEDICINE | Facility: CLINIC | Age: 40
End: 2020-10-08
Payer: COMMERCIAL

## 2020-10-08 DIAGNOSIS — E11.65 TYPE 2 DIABETES MELLITUS WITH HYPERGLYCEMIA, WITHOUT LONG-TERM CURRENT USE OF INSULIN (H): Primary | ICD-10-CM

## 2020-10-08 DIAGNOSIS — E11.65 TYPE 2 DIABETES MELLITUS WITH HYPERGLYCEMIA, WITHOUT LONG-TERM CURRENT USE OF INSULIN (H): ICD-10-CM

## 2020-10-08 LAB
ALBUMIN SERPL-MCNC: 3.2 G/DL (ref 3.4–5)
ALP SERPL-CCNC: 101 U/L (ref 40–150)
ALT SERPL W P-5'-P-CCNC: 14 U/L (ref 0–50)
ANION GAP SERPL CALCULATED.3IONS-SCNC: 6 MMOL/L (ref 3–14)
AST SERPL W P-5'-P-CCNC: 11 U/L (ref 0–45)
BILIRUB SERPL-MCNC: 0.2 MG/DL (ref 0.2–1.3)
BUN SERPL-MCNC: 10 MG/DL (ref 7–30)
CALCIUM SERPL-MCNC: 9.2 MG/DL (ref 8.5–10.1)
CHLORIDE SERPL-SCNC: 106 MMOL/L (ref 94–109)
CHOLEST SERPL-MCNC: 233 MG/DL
CO2 SERPL-SCNC: 25 MMOL/L (ref 20–32)
CREAT SERPL-MCNC: 0.46 MG/DL (ref 0.52–1.04)
GFR SERPL CREATININE-BSD FRML MDRD: >90 ML/MIN/{1.73_M2}
GLUCOSE SERPL-MCNC: 191 MG/DL (ref 70–99)
HBA1C MFR BLD: 7.2 % (ref 0–5.6)
HDLC SERPL-MCNC: 32 MG/DL
LDLC SERPL CALC-MCNC: 130 MG/DL
NONHDLC SERPL-MCNC: 201 MG/DL
POTASSIUM SERPL-SCNC: 4.6 MMOL/L (ref 3.4–5.3)
PROT SERPL-MCNC: 7.4 G/DL (ref 6.8–8.8)
SODIUM SERPL-SCNC: 137 MMOL/L (ref 133–144)
TRIGL SERPL-MCNC: 353 MG/DL
TSH SERPL DL<=0.005 MIU/L-ACNC: 2.46 MU/L (ref 0.4–4)

## 2020-10-08 PROCEDURE — 80061 LIPID PANEL: CPT | Performed by: INTERNAL MEDICINE

## 2020-10-08 PROCEDURE — 83036 HEMOGLOBIN GLYCOSYLATED A1C: CPT | Performed by: INTERNAL MEDICINE

## 2020-10-08 PROCEDURE — 84443 ASSAY THYROID STIM HORMONE: CPT | Performed by: INTERNAL MEDICINE

## 2020-10-08 PROCEDURE — 36415 COLL VENOUS BLD VENIPUNCTURE: CPT | Performed by: INTERNAL MEDICINE

## 2020-10-08 PROCEDURE — 99213 OFFICE O/P EST LOW 20 MIN: CPT | Mod: GT | Performed by: INTERNAL MEDICINE

## 2020-10-08 PROCEDURE — 80053 COMPREHEN METABOLIC PANEL: CPT | Performed by: INTERNAL MEDICINE

## 2020-10-08 PROCEDURE — 82043 UR ALBUMIN QUANTITATIVE: CPT | Performed by: INTERNAL MEDICINE

## 2020-10-08 NOTE — PROGRESS NOTES
"Salima Woodward is a 40 year old female who is being evaluated via a billable video visit.      The patient has been notified of following:     \"This video visit will be conducted via a call between you and your physician/provider. We have found that certain health care needs can be provided without the need for an in-person physical exam.  This service lets us provide the care you need with a video conversation.  If a prescription is necessary we can send it directly to your pharmacy.  If lab work is needed we can place an order for that and you can then stop by our lab to have the test done at a later time.    Video visits are billed at different rates depending on your insurance coverage.  Please reach out to your insurance provider with any questions.    If during the course of the call the physician/provider feels a video visit is not appropriate, you will not be charged for this service.\"    Patient has given verbal consent for Video visit? Yes  How would you like to obtain your AVS? [employer]  If you are dropped from the video visit, the video invite should be resent to: Other e-mail: through [employer]  Will anyone else be joining your video visit? No      Subjective     Salima Woodward is a 40 year old female who presents today via video visit for the following health issues:    HPI     Diabetes Follow-up    How often are you checking your blood sugar? A few times a week  What time of day are you checking your blood sugars (select all that apply)?  Before and after meals  Have you had any blood sugars above 200?  No, pt average blood sugars are between   Have you had any blood sugars below 70?  Yes but, that was before she took herself off of glimepiride which was causing her levels to drop below 70.     What symptoms do you notice when your blood sugar is low?  None    What concerns do you have today about your diabetes? None     Do you have any of these symptoms? (Select all that apply)  No numbness " or tingling in feet.  No redness, sores or blisters on feet.  No complaints of excessive thirst.  No reports of blurry vision.  No significant changes to weight.    Have you had a diabetic eye exam in the last 12 months? No        BP Readings from Last 2 Encounters:   09/05/19 90/60   06/06/19 117/60     Hemoglobin A1C (%)   Date Value   12/06/2019 6.6 (H)   08/29/2019 9.6 (H)     LDL Cholesterol Calculated (mg/dL)   Date Value   05/31/2019     Cannot estimate LDL when triglyceride exceeds 400 mg/dL   01/17/2019     Cannot estimate LDL when triglyceride exceeds 400 mg/dL     LDL Cholesterol Direct (mg/dL)   Date Value   05/31/2019 119 (H)   01/17/2019 105 (H)                 How many servings of fruits and vegetables do you eat daily?  2-3    On average, how many sweetened beverages do you drink each day (Examples: soda, juice, sweet tea, etc.  Do NOT count diet or artificially sweetened beverages)?   0    How many days per week do you exercise enough to make your heart beat faster? 3 or less    How many minutes a day do you exercise enough to make your heart beat faster? 9 or less    How many days per week do you miss taking your medication? 0         Video Start Time: 10:11 AM      Patient following up on type 2 diabetes      Review of Systems   Constitutional, HEENT, cardiovascular, pulmonary, GI, , musculoskeletal, neuro, skin, endocrine and psych systems are negative, except as otherwise noted.      Objective           Vitals:  No vitals were obtained today due to virtual visit.    Physical Exam     GENERAL: Healthy, alert and no distress  EYES: Eyes grossly normal to inspection.  No discharge or erythema, or obvious scleral/conjunctival abnormalities.  RESP: No audible wheeze, cough, or visible cyanosis.  No visible retractions or increased work of breathing.    SKIN: Visible skin clear. No significant rash, abnormal pigmentation or lesions.  NEURO: Cranial nerves grossly intact.  Mentation and speech  appropriate for age.  PSYCH: Mentation appears normal, affect normal/bright, judgement and insight intact, normal speech and appearance well-groomed.              Assessment & Plan     1. Type 2 diabetes mellitus with hyperglycemia, without long-term current use of insulin (H)  Await results of today's labs, appears to be well-controlled judging by reported daily accu-checks.        Tobacco Cessation:   reports that she has been smoking. She has been smoking about 0.50 packs per day. She has never used smokeless tobacco.               No follow-ups on file.    Samm Holbrook MD  [facility]      Video-Visit Details    Type of service:  Video Visit    Video End Time:10:19 AM    Originating Location (pt. Location): Home    Distant Location (provider location):  [facility]     Platform used for Video Visit: Munir

## 2020-10-09 LAB
CREAT UR-MCNC: 65 MG/DL
MICROALBUMIN UR-MCNC: <5 MG/L
MICROALBUMIN/CREAT UR: NORMAL MG/G CR (ref 0–25)

## 2021-01-14 ENCOUNTER — HEALTH MAINTENANCE LETTER (OUTPATIENT)
Age: 41
End: 2021-01-14

## 2021-02-23 ENCOUNTER — APPOINTMENT (OUTPATIENT)
Dept: GENERAL RADIOLOGY | Facility: CLINIC | Age: 41
End: 2021-02-23
Attending: EMERGENCY MEDICINE
Payer: COMMERCIAL

## 2021-02-23 ENCOUNTER — HOSPITAL ENCOUNTER (EMERGENCY)
Facility: CLINIC | Age: 41
Discharge: HOME | End: 2021-02-23
Attending: EMERGENCY MEDICINE | Admitting: EMERGENCY MEDICINE
Payer: COMMERCIAL

## 2021-02-23 VITALS
HEART RATE: 98 BPM | RESPIRATION RATE: 22 BRPM | WEIGHT: 130 LBS | DIASTOLIC BLOOD PRESSURE: 75 MMHG | SYSTOLIC BLOOD PRESSURE: 125 MMHG | BODY MASS INDEX: 23.92 KG/M2 | TEMPERATURE: 98.4 F | HEIGHT: 62 IN | OXYGEN SATURATION: 96 %

## 2021-02-23 DIAGNOSIS — S83.92XA SPRAIN OF LEFT KNEE, UNSPECIFIED LIGAMENT, INITIAL ENCOUNTER: ICD-10-CM

## 2021-02-23 LAB
ABO + RH BLD: NORMAL
ABO + RH BLD: NORMAL
ANION GAP SERPL CALCULATED.3IONS-SCNC: 7 MMOL/L (ref 3–14)
APTT PPP: 38 SEC (ref 22–37)
BASOPHILS # BLD AUTO: 0.1 10E9/L (ref 0–0.2)
BASOPHILS NFR BLD AUTO: 0.4 %
BLD GP AB SCN SERPL QL: NORMAL
BLOOD BANK CMNT PATIENT-IMP: NORMAL
BUN SERPL-MCNC: 9 MG/DL (ref 7–30)
CALCIUM SERPL-MCNC: 9.2 MG/DL (ref 8.5–10.1)
CHLORIDE SERPL-SCNC: 106 MMOL/L (ref 94–109)
CO2 SERPL-SCNC: 26 MMOL/L (ref 20–32)
CREAT SERPL-MCNC: 0.46 MG/DL (ref 0.52–1.04)
DIFFERENTIAL METHOD BLD: ABNORMAL
EOSINOPHIL # BLD AUTO: 0.1 10E9/L (ref 0–0.7)
EOSINOPHIL NFR BLD AUTO: 1 %
ERYTHROCYTE [DISTWIDTH] IN BLOOD BY AUTOMATED COUNT: 13.1 % (ref 10–15)
GFR SERPL CREATININE-BSD FRML MDRD: >90 ML/MIN/{1.73_M2}
GLUCOSE SERPL-MCNC: 268 MG/DL (ref 70–99)
HCT VFR BLD AUTO: 43.3 % (ref 35–47)
HGB BLD-MCNC: 13.8 G/DL (ref 11.7–15.7)
IMM GRANULOCYTES # BLD: 0.1 10E9/L (ref 0–0.4)
IMM GRANULOCYTES NFR BLD: 0.9 %
INR PPP: 0.98 (ref 0.86–1.14)
LYMPHOCYTES # BLD AUTO: 4 10E9/L (ref 0.8–5.3)
LYMPHOCYTES NFR BLD AUTO: 35.5 %
MCH RBC QN AUTO: 27.2 PG (ref 26.5–33)
MCHC RBC AUTO-ENTMCNC: 31.9 G/DL (ref 31.5–36.5)
MCV RBC AUTO: 85 FL (ref 78–100)
MONOCYTES # BLD AUTO: 0.7 10E9/L (ref 0–1.3)
MONOCYTES NFR BLD AUTO: 6.4 %
NEUTROPHILS # BLD AUTO: 6.4 10E9/L (ref 1.6–8.3)
NEUTROPHILS NFR BLD AUTO: 55.8 %
NRBC # BLD AUTO: 0 10*3/UL
NRBC BLD AUTO-RTO: 0 /100
PLATELET # BLD AUTO: 341 10E9/L (ref 150–450)
POTASSIUM SERPL-SCNC: 3.8 MMOL/L (ref 3.4–5.3)
RBC # BLD AUTO: 5.07 10E12/L (ref 3.8–5.2)
SODIUM SERPL-SCNC: 139 MMOL/L (ref 133–144)
SPECIMEN EXP DATE BLD: NORMAL
WBC # BLD AUTO: 11.4 10E9/L (ref 4–11)

## 2021-02-23 PROCEDURE — 73562 X-RAY EXAM OF KNEE 3: CPT | Mod: LT

## 2021-02-23 PROCEDURE — 86850 RBC ANTIBODY SCREEN: CPT | Performed by: EMERGENCY MEDICINE

## 2021-02-23 PROCEDURE — 99285 EMERGENCY DEPT VISIT HI MDM: CPT | Mod: 25

## 2021-02-23 PROCEDURE — 73502 X-RAY EXAM HIP UNI 2-3 VIEWS: CPT

## 2021-02-23 PROCEDURE — 86900 BLOOD TYPING SEROLOGIC ABO: CPT | Performed by: EMERGENCY MEDICINE

## 2021-02-23 PROCEDURE — 258N000003 HC RX IP 258 OP 636: Performed by: EMERGENCY MEDICINE

## 2021-02-23 PROCEDURE — 93005 ELECTROCARDIOGRAM TRACING: CPT

## 2021-02-23 PROCEDURE — 80048 BASIC METABOLIC PNL TOTAL CA: CPT | Performed by: EMERGENCY MEDICINE

## 2021-02-23 PROCEDURE — 96374 THER/PROPH/DIAG INJ IV PUSH: CPT

## 2021-02-23 PROCEDURE — 86901 BLOOD TYPING SEROLOGIC RH(D): CPT | Performed by: EMERGENCY MEDICINE

## 2021-02-23 PROCEDURE — 85610 PROTHROMBIN TIME: CPT | Performed by: EMERGENCY MEDICINE

## 2021-02-23 PROCEDURE — 250N000011 HC RX IP 250 OP 636: Performed by: EMERGENCY MEDICINE

## 2021-02-23 PROCEDURE — 96361 HYDRATE IV INFUSION ADD-ON: CPT

## 2021-02-23 PROCEDURE — 85730 THROMBOPLASTIN TIME PARTIAL: CPT | Performed by: EMERGENCY MEDICINE

## 2021-02-23 PROCEDURE — 96375 TX/PRO/DX INJ NEW DRUG ADDON: CPT

## 2021-02-23 PROCEDURE — 85025 COMPLETE CBC W/AUTO DIFF WBC: CPT | Performed by: EMERGENCY MEDICINE

## 2021-02-23 RX ORDER — ONDANSETRON 4 MG/1
4 TABLET, ORALLY DISINTEGRATING ORAL EVERY 6 HOURS PRN
Qty: 10 TABLET | Refills: 0 | Status: SHIPPED | OUTPATIENT
Start: 2021-02-23 | End: 2021-02-26

## 2021-02-23 RX ORDER — OXYCODONE AND ACETAMINOPHEN 5; 325 MG/1; MG/1
1 TABLET ORAL EVERY 6 HOURS PRN
Qty: 12 TABLET | Refills: 0 | Status: SHIPPED | OUTPATIENT
Start: 2021-02-23 | End: 2021-11-19

## 2021-02-23 RX ORDER — HYDROMORPHONE HYDROCHLORIDE 1 MG/ML
0.5 INJECTION, SOLUTION INTRAMUSCULAR; INTRAVENOUS; SUBCUTANEOUS
Status: DISCONTINUED | OUTPATIENT
Start: 2021-02-23 | End: 2021-02-23 | Stop reason: HOSPADM

## 2021-02-23 RX ORDER — ONDANSETRON 2 MG/ML
4 INJECTION INTRAMUSCULAR; INTRAVENOUS ONCE
Status: COMPLETED | OUTPATIENT
Start: 2021-02-23 | End: 2021-02-23

## 2021-02-23 RX ADMIN — ONDANSETRON 4 MG: 2 INJECTION INTRAMUSCULAR; INTRAVENOUS at 07:44

## 2021-02-23 RX ADMIN — HYDROMORPHONE HYDROCHLORIDE 0.5 MG: 1 INJECTION, SOLUTION INTRAMUSCULAR; INTRAVENOUS; SUBCUTANEOUS at 07:44

## 2021-02-23 RX ADMIN — SODIUM CHLORIDE 1000 ML: 9 INJECTION, SOLUTION INTRAVENOUS at 07:48

## 2021-02-23 ASSESSMENT — ENCOUNTER SYMPTOMS
WOUND: 1
ARTHRALGIAS: 1
HEADACHES: 0

## 2021-02-23 ASSESSMENT — MIFFLIN-ST. JEOR: SCORE: 1207.93

## 2021-02-23 NOTE — ED PROVIDER NOTES
"  History   Chief Complaint:  Leg Pain       The history is provided by the patient and a relative.      Salima Woodward is a type 2 diabetic 41 year old female with a history of hyperlipidemia and low back pain who presents with left hip and knee pain after slipping and falling on the ice this morning. She's been unable to bear weight on the LLE. The patient did not hit her head during the fall, though she does have a small abrasion to her left knee. She also notes that she recently injured her right ankle in November 2020.  No other concerns.    Review of Systems   Musculoskeletal: Positive for arthralgias (L hip, L knee).   Skin: Positive for wound (L knee abrasion).   Neurological: Negative for syncope and headaches.   All other systems reviewed and are negative.    Allergies:  Amoxicillin  Azithromycin  Metformin  Pioglitazone  Tetracyclines    Medications:  Aspirin 81 mg    Past Medical History:    Migraines  Pituitary microadenoma  Type 2 diabetes mellitus  Hyperlipidemia    Past Surgical History:    Colostomy  Takedown colostomy    Family History:    Father - type 2 diabetes mellitus  Mother - type 2 diabetes mellitus, PCOS    Social History:  The patient was accompanied to the ED by her father.  PCP: Samm Holbrook    Physical Exam     Patient Vitals for the past 24 hrs:   BP Temp Temp src Pulse Resp SpO2 Height Weight   02/23/21 0835 -- -- -- -- -- 96 % -- --   02/23/21 0830 -- -- -- -- -- 96 % -- --   02/23/21 0820 -- -- -- -- -- 100 % -- --   02/23/21 0800 -- -- -- -- -- 98 % -- --   02/23/21 0755 -- -- -- -- -- 98 % -- --   02/23/21 0750 -- -- -- -- -- 99 % -- --   02/23/21 0745 -- -- -- -- -- 100 % -- --   02/23/21 0726 125/75 98.4  F (36.9  C) Temporal 98 22 99 % 1.575 m (5' 2\") 59 kg (130 lb)       Physical Exam  General/Appearance: appears stated age, well-groomed, appears to be in significant distress and pain  Eyes: EOMI, no scleral injection, no icterus  ENT: MMM  Neck: supple, nl ROM, no " stiffness  Cardiovascular: RRR, nl S1S2, no m/r/g, 2+ pulses in all 4 extremities, cap refill <2sec  Respiratory: CTAB, good air movement throughout, no wheezes/rhonchi/rales, no increased WOB, no retractions  GI: abd soft, non-distended, nttp,  no HSM, no rebound, no guarding, nl BS  MSK: holding LLE with knee bent, unable to range LLE at hip or knee 2/2 pain, significant pain to palpation of lateral aspect of L hip and knee, no ttp over L patella  Skin: warm and well-perfused, abrasions to lateral L knee  Neuro: GCS 15, alert and oriented, no gross focal neuro deficits  Heme: no petechia, no purpura, no active bleeding    Emergency Department Course     Imaging:  XR Pelvis and Hip Left 2 Views:  Negative exam.  Report per radiology.    XR Knee Left 3 Views:  Small calcification projecting over Hoffa's fat pad on the lateral view of uncertain significance. Otherwise unremarkable knee radiographs.  Report per radiology.    Laboratory:  CBC: WBC 11.4 (H), HGB 13.8,   BMP: Glucose 268 (H), Creatinine 0.46 (L), o/w WNL    PTT: 38 (H)  INR: 0.98    ABO/Rh Type and Screen: O negative    Emergency Department Course:    Reviewed:  I reviewed the patient's nursing notes, vitals, past medical history and care everywhere.     Assessments:  0728 I performed an exam of the patient in room 06 as documented above.  0904 Patient rechecked and updated.      Interventions:  0744 Dilaudid 0.5 mg IV  0744 Zofran 4 mg IV  0748 NS 1L IV Bolus     Disposition:  The patient was discharged to home.     Impression & Plan     Medical Decision Making:  Salima Woodward is a 41 year old female who presents with left knee pain following a mechanical fall.  She has got tenderness to palpation to the lateral knee and hip.  X-rays of these are negative.  As she is still having significant pain I explained it is very possible that she has internal damage such as to some of her collateral ligaments.  This is not something that we would MRI  here in the ER but it is something that she would likely will require follow-up with either [facility], [facility], or other orthopedic service for. She is got crutches in the car and she feels comfortable going home. She will be sent home with a handful of pain meds and nausea meds.    Diagnosis:    ICD-10-CM    1. Sprain of left knee, unspecified ligament, initial encounter  S83.92XA        Discharge Medications:   New Prescriptions    ONDANSETRON (ZOFRAN ODT) 4 MG ODT TAB    Take 1 tablet (4 mg) by mouth every 6 hours as needed for nausea or vomiting    OXYCODONE-ACETAMINOPHEN (PERCOCET) 5-325 MG TABLET    Take 1 tablet by mouth every 6 hours as needed for pain       Scribe Disclosure:  I, Lorenza Johnson, am serving as a scribe at 7:28 AM on 2/23/2021 to document services personally performed by Leanna Yang MD based on my observations and the provider's statements to me.     This note was completed in part using Dragon voice recognition software. Although reviewed after completion, some word and grammatical errors may occur.     Lorenza Johnson  2/23/2021   [facility] EMERGENCY DEPARTMENT         Leanna Yang MD  02/23/21 3558

## 2021-02-23 NOTE — LETTER
February 23, 2021      To Whom It May Concern:          Salima Woodward was seen in our Emergency Department today, 02/23/21.  I expect her to be out of work for the next 1-3 days.  She may return to work when cleared by follow-up Provider.            Sincerely,        CEASAR Espino

## 2021-03-26 ENCOUNTER — TELEPHONE (OUTPATIENT)
Dept: INTERNAL MEDICINE | Facility: CLINIC | Age: 41
End: 2021-03-26

## 2021-03-26 NOTE — TELEPHONE ENCOUNTER
Reason for Call:  Form, our goal is to have forms completed with 72 hours, however, some forms may require a visit or additional information.    Type of letter, form or note:  Insulin-Treated Diabetes Mellitus Report    Who is the form from?: [place] [employer] (if other please explain)    Where did the form come from: Patient or family brought in       What clinic location was the form placed at?: Internal Medicine    Where the form was placed: Pcp's Folder    What number is listed as a contact on the form?: [phone]       Additional comments:     Call taken on 3/26/2021 at 9:48 AM by Elsi Temple

## 2021-03-31 NOTE — TELEPHONE ENCOUNTER
Form faxed to [place] [employer] at [phone] and placed at IM  for  per patients request.  Copy made and sent to be scanned into patients chart.

## 2021-04-22 ENCOUNTER — TELEPHONE (OUTPATIENT)
Dept: INTERNAL MEDICINE | Facility: CLINIC | Age: 41
End: 2021-04-22

## 2021-04-22 DIAGNOSIS — E11.65 TYPE 2 DIABETES MELLITUS WITH HYPERGLYCEMIA, WITHOUT LONG-TERM CURRENT USE OF INSULIN (H): Primary | ICD-10-CM

## 2021-04-22 RX ORDER — SEMAGLUTIDE 1.34 MG/ML
1 INJECTION, SOLUTION SUBCUTANEOUS
Qty: 3 ML | Refills: 11 | Status: SHIPPED | OUTPATIENT
Start: 2021-04-22 | End: 2021-06-21

## 2021-04-22 NOTE — TELEPHONE ENCOUNTER
OZEMPIC, 1 MG/DOSE, 2 MG/1.5ML pen is not available, pharmacy is requesting a new rx for OZEMPIC 1MG Dose Pen (4MG/3ML) which is the same as 2MG/1.5ML Pen.  Please send new rx.

## 2021-05-09 ENCOUNTER — HEALTH MAINTENANCE LETTER (OUTPATIENT)
Age: 41
End: 2021-05-09

## 2021-06-15 DIAGNOSIS — E11.65 TYPE 2 DIABETES MELLITUS WITH HYPERGLYCEMIA, WITHOUT LONG-TERM CURRENT USE OF INSULIN (H): ICD-10-CM

## 2021-06-17 NOTE — TELEPHONE ENCOUNTER
Routing refill request to provider for review/approval because:  Labs out of range:  CR  Labs not current:  HgbA1c  Patient needs to be seen because it has been more than 6 months since last office visit.      Creatinine   Date Value Ref Range Status   02/23/2021 0.46 (L) 0.52 - 1.04 mg/dL Final

## 2021-06-18 ENCOUNTER — TELEPHONE (OUTPATIENT)
Dept: INTERNAL MEDICINE | Facility: CLINIC | Age: 41
End: 2021-06-18

## 2021-06-18 NOTE — TELEPHONE ENCOUNTER
Central Prior Authorization Team   Phone: [phone]      Prior Authorization Approval    Authorization Effective Date: 6/18/2021  Authorization Expiration Date: 6/18/2022  Medication: Ozempic - APPROVED  Approved Dose/Quantity: 3 FOR 28  Reference #:     Insurance Company: [employer]  Expected CoPay:       CoPay Card Available:      Foundation Assistance Needed:    Which Pharmacy is filling the prescription (Not needed for infusion/clinic administered): [facility] [address] [place] [address]  Pharmacy Notified: Yes  Patient Notified: Yes (**Instructed pharmacy to notify patient when script is ready to /ship.**)

## 2021-06-18 NOTE — TELEPHONE ENCOUNTER
Patient calling, Ozempic needs PA.      Prior Authorization Specialty Medication Request    Medication/Dose: Ozempic  ICD code (if different than what is on RX):  [E11.65]    Previously Tried and Failed:  Per chart review    Important Lab Values:   Lab Results   Component Value Date    A1C 7.2 10/08/2020    A1C 6.6 12/06/2019    A1C 9.6 08/29/2019    A1C 13.0 05/31/2019    A1C 12.8 01/17/2019       Rationale: PER MD/ Chart Review    Ins[facility] Name: [facility]  Insurance ID: [id number]  BIN:[id number]  Group: [facility]    Pharmacy Information (if different than what is on RX)  Name:  [facility]  Phone:  Per chart review

## 2021-06-21 RX ORDER — SEMAGLUTIDE 1.34 MG/ML
1 INJECTION, SOLUTION SUBCUTANEOUS
Qty: 3 ML | Refills: 11 | Status: SHIPPED | OUTPATIENT
Start: 2021-06-21 | End: 2021-11-19

## 2021-08-08 ENCOUNTER — HOSPITAL ENCOUNTER (EMERGENCY)
Facility: CLINIC | Age: 41
Discharge: HOME | End: 2021-08-09
Attending: EMERGENCY MEDICINE | Admitting: EMERGENCY MEDICINE
Payer: COMMERCIAL

## 2021-08-08 VITALS
SYSTOLIC BLOOD PRESSURE: 147 MMHG | WEIGHT: 130 LBS | HEART RATE: 114 BPM | BODY MASS INDEX: 23.92 KG/M2 | RESPIRATION RATE: 20 BRPM | DIASTOLIC BLOOD PRESSURE: 78 MMHG | HEIGHT: 62 IN | OXYGEN SATURATION: 98 % | TEMPERATURE: 98.5 F

## 2021-08-08 DIAGNOSIS — N76.0 VAGINITIS AND VULVOVAGINITIS: ICD-10-CM

## 2021-08-08 LAB
CLUE CELLS: ABNORMAL
TRICHOMONAS, WET PREP: ABNORMAL
WBC'S/HIGH POWER FIELD, WET PREP: ABNORMAL
YEAST, WET PREP: ABNORMAL

## 2021-08-08 PROCEDURE — 87210 SMEAR WET MOUNT SALINE/INK: CPT | Performed by: EMERGENCY MEDICINE

## 2021-08-08 PROCEDURE — 87591 N.GONORRHOEAE DNA AMP PROB: CPT | Performed by: EMERGENCY MEDICINE

## 2021-08-08 PROCEDURE — 99284 EMERGENCY DEPT VISIT MOD MDM: CPT

## 2021-08-08 PROCEDURE — 87491 CHLMYD TRACH DNA AMP PROBE: CPT | Performed by: EMERGENCY MEDICINE

## 2021-08-08 PROCEDURE — 250N000009 HC RX 250: Performed by: EMERGENCY MEDICINE

## 2021-08-08 RX ORDER — LIDOCAINE HYDROCHLORIDE 20 MG/ML
10 JELLY TOPICAL ONCE
Status: COMPLETED | OUTPATIENT
Start: 2021-08-08 | End: 2021-08-08

## 2021-08-08 RX ORDER — MICONAZOLE NITRATE 2 %
1 CREAM WITH APPLICATOR VAGINAL ONCE
Status: DISCONTINUED | OUTPATIENT
Start: 2021-08-08 | End: 2021-08-09 | Stop reason: HOSPADM

## 2021-08-08 RX ADMIN — LIDOCAINE HYDROCHLORIDE 10 ML: 20 JELLY TOPICAL at 22:39

## 2021-08-08 ASSESSMENT — ENCOUNTER SYMPTOMS
COLOR CHANGE: 1
DYSURIA: 1
ABDOMINAL PAIN: 1

## 2021-08-08 ASSESSMENT — MIFFLIN-ST. JEOR: SCORE: 1207.93

## 2021-08-09 LAB
C TRACH DNA SPEC QL NAA+PROBE: NEGATIVE
N GONORRHOEA DNA SPEC QL NAA+PROBE: NEGATIVE

## 2021-08-09 NOTE — ED NOTES
Patient had severe pain when getting the pelvic exam. MD ordered topical lidocaine jelly to be placed on the exterior vagina for some pain relief. Patient continued to complain of severe pain in the vaginal area and has been hyperventilating. Patient given ice packs for comfort.

## 2021-08-09 NOTE — RESULT ENCOUNTER NOTE
Final result for both N. Gonorrhoeae PCR and Chlamydia Trachomatis PCR are NEGATIVE.  No treatment or change in treatment per [facility] ED Lab Result N. Gonorrhea AND/OR Chlamydia T. protocol.

## 2021-08-09 NOTE — ED NOTES
"Walked into pt's room to give miconazole. Pt getting dressed and states that \"I'm leaving! I need to go to home and sleep! No one is doing anything for me!\" Pt again offered the miconzaole cream and refuses. Pt's nurse updated.  "

## 2021-08-09 NOTE — ED PROVIDER NOTES
"  History   Chief Complaint:  Vaginal Problem     The history is provided by the patient.      Salima Woodward is a 41 year old female with history of migraines and type 2 DM who presents with vaginal problem. The patient reports that 2 days ago (08.06.21) her vagina started to itch after she had accidentally sat on some Talcum powder, which got into her vagina. She states that her vagina started to become painful and felt like it was burning yesterday, which prompted her visit today. She endorses redness and abdominal discomfort as well. She denies discharge.     Review of Systems   Gastrointestinal: Positive for abdominal pain.   Genitourinary: Positive for dysuria and vaginal pain. Negative for vaginal discharge.   Skin: Positive for color change.   All other systems reviewed and are negative.    Allergies:  Amoxicillin  Azithromycin  Metformin  Pioglitazone  Tetracyclines    Medications:  Ozempic    Past Medical History:    Migraines   Type 2 DM    Past Surgical History:    Colostomy     Family History:    DM  Polycystic ovary syndrome    Social History:  Presents with mother    Physical Exam     Patient Vitals for the past 24 hrs:   BP Temp Temp src Pulse Resp SpO2 Height Weight   08/08/21 2153 -- 98.5  F (36.9  C) Oral -- 20 -- 1.575 m (5' 2\") 59 kg (130 lb)   08/08/21 2152 (!) 147/78 -- -- 114 -- 98 % -- --       Physical Exam  General: Alert, No distress. Nontoxic appearance  Head: No signs of trauma.   Mouth/Throat: Oropharynx moist.   Eyes: Conjunctivae are normal. Pupils are equal..   Neck: Normal range of motion.    CV: Appears well perfused.  Abdominal: Ileostomy scar on RUQ  Resp:No respiratory distress.   MSK: Normal range of motion. No obvious deformity.   Neuro: The patient is alert and interactive. RUSSELL. Speech normal. GCS 15  Skin: No lesion or sign of trauma noted.   Psych: normal mood and affect. behavior is normal.   Pelvic: Severe pain with any manipulation of vulva. Distinct white specs " throughout mucosa.     Emergency Department Course   Laboratory:  Wet Prep: 3+   Chlamydia PCR: Pending  Gonorrheae PCR: Pending    Emergency Department Course:    Reviewed:  I reviewed nursing notes, vitals, past medical history and care everywhere    Assessments:  2201 I obtained history and examined the patient as noted above.   2220    I performed a chaperones pelvic exam.   2319 I rechecked the patient and explained findings.     Interventions:  2239 Xylocaine, 10 mL, Topical     Disposition:  The patient was discharged to home.     Impression & Plan   Medical Decision Making:  Salima Woodward is a 41 year old female who presents for evaluation of vaginal discharge, vaginal itching and vaginal pain after sitting on Talcum powder. The story and exam is consistent yeast vaginitis with history of clumpy whitish vaginal discharge with itching and without urinary symptoms of internal dysuria, frequency, fevers, or back pain.  The pelvic exam was difficult for the patient, so reliance on the testing is suspect. Pelvic exam and wet prep showed clumpy white discharge.  Will start on vagistat and follow up closely with primary care physician.     Diagnosis:    ICD-10-CM    1. Vaginitis and vulvovaginitis  N76.0        Discharge Medications:  Discharge Medication List as of 8/9/2021 12:03 AM      START taking these medications    Details   tioconazole (VAGISTAT-1) 6.5 % ointment Apply 1 applicator topically At Bedtime for 7 days, Disp-50 g, R-0, Local Print             Scribe Disclosure:  Bobby HA, am serving as a scribe at 9:57 PM on 8/8/2021 to document services personally performed by Oscar Matias MD based on my observations and the provider's statements to me.            Oscar Matias MD  08/09/21 0134

## 2021-10-24 ENCOUNTER — HEALTH MAINTENANCE LETTER (OUTPATIENT)
Age: 41
End: 2021-10-24

## 2021-11-19 ENCOUNTER — VIRTUAL VISIT (OUTPATIENT)
Dept: INTERNAL MEDICINE | Facility: CLINIC | Age: 41
End: 2021-11-19
Payer: COMMERCIAL

## 2021-11-19 DIAGNOSIS — Z28.21 VACCINATION HESITANCY BY PATIENT: ICD-10-CM

## 2021-11-19 DIAGNOSIS — E11.65 TYPE 2 DIABETES MELLITUS WITH HYPERGLYCEMIA, WITHOUT LONG-TERM CURRENT USE OF INSULIN (H): ICD-10-CM

## 2021-11-19 DIAGNOSIS — M79.674 PAIN OF TOE OF RIGHT FOOT: Primary | ICD-10-CM

## 2021-11-19 PROCEDURE — 99214 OFFICE O/P EST MOD 30 MIN: CPT | Mod: 95 | Performed by: NURSE PRACTITIONER

## 2021-11-19 RX ORDER — IBUPROFEN 800 MG/1
TABLET, FILM COATED ORAL
Qty: 90 TABLET | Refills: 1 | Status: SHIPPED | OUTPATIENT
Start: 2021-11-19 | End: 2023-10-11

## 2021-11-19 RX ORDER — SEMAGLUTIDE 1.34 MG/ML
1 INJECTION, SOLUTION SUBCUTANEOUS
Qty: 3 ML | Refills: 11 | Status: SHIPPED | OUTPATIENT
Start: 2021-11-19 | End: 2022-09-14

## 2021-11-19 RX ORDER — GLIMEPIRIDE 4 MG/1
4 TABLET ORAL
Qty: 90 TABLET | Refills: 3 | Status: SHIPPED | OUTPATIENT
Start: 2021-11-19 | End: 2022-09-22

## 2021-11-19 NOTE — PROGRESS NOTES
Salima is a 41 year old who is being evaluated via a billable telephone visit.      What phone number would you like to be contacted at? [phone]  How would you like to obtain your AVS? MyChart    Assessment & Plan     Type 2 diabetes mellitus with hyperglycemia, without long-term current use of insulin (H)  - Reports restarting glimepiride a few months ago due to elevated BG's despite taking weekly Ozempic; last A1c 7.2%  - Refilled Ozempic and glimepiride  - Continue checking BG's  - Labs ordered- A1c, CMP, lipid, urine albumin  - Not on a statin, is not willing to take as she does not believe in taking a lot of medication.  Discussed concern re: DM and vascular disease; pt acknowledges and verbalizes an understanding.  - Hemoglobin A1c  - Semaglutide, 1 MG/DOSE, (OZEMPIC, 1 MG/DOSE,) 4 MG/3ML SOPN  Dispense: 3 mL; Refill: 11  - Comprehensive metabolic panel (BMP + Alb, Alk Phos, ALT, AST, Total. Bili, TP)  - Lipid Profile (Chol, Trig, HDL, LDL calc)  - TSH with free T4 reflex  - Albumin Random Urine Quantitative with Creat Ratio  - glimepiride (AMARYL) 4 MG tablet  Dispense: 90 tablet; Refill: 3       Pain of toe of right foot  - Reports several month hx of right great toe and 5th toe pain, redness, swelling, joint pain.  Taking cherry extract without improvement.    - Uric acid ordered; Xray ordered  - Uric acid  - XR Toe Right G/E 2 Views    Vaccination hesitancy by patient  - Expresses concern re: getting COVID vaccine due to allergy hx and disbelief in the vaccine.  Discussed that the COVID vaccine will not give her COVID.  Discussed that the vaccine is not a dead/live virus- explained it is an mRNA that targets a spike protein.  Reviewed her allergy hx and discussed that I thought it would be safe for her to have the vaccine. Expresses concern that if she either needs to get the vaccine or have a medical exemption from her employer.  I do not believe she has a strict medical contraindication at this  time.  She then stated she doesn't completely believe the information currently available.  Asked how I could help her feel more comfortable about getting the vaccine, but she declined and thanked me for the information provided.    [id number]}     Tobacco Cessation:   reports that she has been smoking. She has been smoking about 1.00 pack per day. She has never used smokeless tobacco.  Tobacco Cessation Action Plan: Information offered: Patient not interested at this time    See Patient Instructions    Return in about 1 year (around 11/19/2022) for Diabetes check- sooner if DM control suboptimal.    MICHAEL Bhatia CNP  [facility] Borrego is a 41 year old who presents for the following health issues     HPI     COVID vaccine questions  Toe concerns  DM follow up    Diabetes Follow-up      How often are you checking your blood sugar? Varies when she feels like it    What concerns do you have today about your diabetes? None     Do you have any of these symptoms? (Select all that apply)  Redness, sores, or blisters on feet-open area due to injury-right foot-feels like having gout symptoms    Have you had a diabetic eye exam in the last 12 months? No        BP Readings from Last 2 Encounters:   08/08/21 (!) 147/78   02/23/21 125/75     Hemoglobin A1C (%)   Date Value   10/08/2020 7.2 (H)   12/06/2019 6.6 (H)     LDL Cholesterol Calculated (mg/dL)   Date Value   10/08/2020 130 (H)   05/31/2019     Cannot estimate LDL when triglyceride exceeds 400 mg/dL     LDL Cholesterol Direct (mg/dL)   Date Value   05/31/2019 119 (H)                 How many servings of fruits and vegetables do you eat daily?  0-1eats canned fruit; some canned vegetables    On average, how many sweetened beverages do you drink each day (Examples: soda, juice, sweet tea, etc.  Do NOT count diet or artificially sweetened beverages)?   0    How many days per week do you exercise enough to make  "your heart beat faster? 3 or less; works 72 hours per week, 12/14 days    How many minutes a day do you exercise enough to make your heart beat faster? 9 or less    How many days per week do you miss taking your medication? 0    Diabetes:  -BG's ranging ; sometimes as high as 200-300 \"when Ozempic wears off\".  Has started taking Glimepiride 4 mg daily for the past couple of months due to high BG (using old Rx).  Endorses low BG's on occasion, even when she gets to 100- gets irritable, shaky, hungry; treats with small can of Dr. Pepper.  Obtains her diabetes testing supplies off [facility] as they are cheaper.  Not taking a statin- \"not a pill popper\".      Toe pain:  Reports pain in right great toe and little toe for several months.  Pain is reportedly in the joint.  States right great toe pops, hurts, is red, swollen.  Is concerned she may have gout. Pain is worse with ambulation; lays on her side in bed to take pressure off foot.  Has been taking cherry extract without relief.  Requests Uric Acid check. Does not want a referral to a specialist as she reports her insurance coverage is very limited.    COVID Vaccination Concern:  Requests medical exemption for COVID vaccine.  States her employer is requesting vaccination or she's in trouble of losing her job.  She expresses concern about getting COVID if she gets the vaccine as well as concerns re: her allergy history.  Discussed that the vaccine will not give her COVID. She does not believe the information that has been given out regarding the vaccine.      Review of Systems   CONSTITUTIONAL:NEGATIVE for fever, chills, change in weight  INTEGUMENTARY/SKIN: NEGATIVE for worrisome rashes, moles or lesions  RESP:NEGATIVE for significant cough or SOB  CV: NEGATIVE for chest pain, palpitations or peripheral edema  MUSCULOSKELETAL: POSITIVE  for right great toe, right 5th toe pain, redness, swelling      Objective           Vitals:  No vitals were obtained today due to " virtual visit.    Physical Exam   healthy, alert and no distress  PSYCH: Alert and oriented times 3; coherent speech, normal   rate and volume, able to articulate logical thoughts, able   to abstract reason, no tangential thoughts, no hallucinations   or delusions  Her affect is normal  RESP: No cough, no audible wheezing, able to talk in full sentences  Remainder of exam unable to be completed due to telephone visits           Phone call duration: 14 minutes

## 2021-11-19 NOTE — PATIENT INSTRUCTIONS
-Ketan Borrego, thanks for meeting with me today  -I sent your OZemic and Glimepiride to the pharmacy, as well as ibuprofen  -Please come in for fasting labs - schedule lab appointment  -I ordered a uric acid and foot xray- I will let you know the results when I get them  -Take care

## 2021-12-18 ENCOUNTER — ANCILLARY PROCEDURE (OUTPATIENT)
Dept: GENERAL RADIOLOGY | Facility: CLINIC | Age: 41
End: 2021-12-18
Attending: NURSE PRACTITIONER
Payer: COMMERCIAL

## 2021-12-18 ENCOUNTER — LAB (OUTPATIENT)
Dept: LAB | Facility: CLINIC | Age: 41
End: 2021-12-18
Payer: COMMERCIAL

## 2021-12-18 DIAGNOSIS — M79.674 PAIN OF TOE OF RIGHT FOOT: ICD-10-CM

## 2021-12-18 DIAGNOSIS — E11.65 TYPE 2 DIABETES MELLITUS WITH HYPERGLYCEMIA, WITHOUT LONG-TERM CURRENT USE OF INSULIN (H): ICD-10-CM

## 2021-12-18 LAB — HBA1C MFR BLD: 9.9 % (ref 0–5.6)

## 2021-12-18 PROCEDURE — 80053 COMPREHEN METABOLIC PANEL: CPT

## 2021-12-18 PROCEDURE — 36415 COLL VENOUS BLD VENIPUNCTURE: CPT

## 2021-12-18 PROCEDURE — 83036 HEMOGLOBIN GLYCOSYLATED A1C: CPT

## 2021-12-18 PROCEDURE — 80061 LIPID PANEL: CPT

## 2021-12-18 PROCEDURE — 73660 X-RAY EXAM OF TOE(S): CPT | Mod: RT | Performed by: RADIOLOGY

## 2021-12-18 PROCEDURE — 84443 ASSAY THYROID STIM HORMONE: CPT

## 2021-12-18 PROCEDURE — 82043 UR ALBUMIN QUANTITATIVE: CPT

## 2021-12-18 PROCEDURE — 84550 ASSAY OF BLOOD/URIC ACID: CPT

## 2021-12-19 LAB
ALBUMIN SERPL-MCNC: 3.6 G/DL (ref 3.4–5)
ALP SERPL-CCNC: 100 U/L (ref 40–150)
ALT SERPL W P-5'-P-CCNC: 16 U/L (ref 0–50)
ANION GAP SERPL CALCULATED.3IONS-SCNC: 5 MMOL/L (ref 3–14)
AST SERPL W P-5'-P-CCNC: 10 U/L (ref 0–45)
BILIRUB SERPL-MCNC: 0.2 MG/DL (ref 0.2–1.3)
BUN SERPL-MCNC: 6 MG/DL (ref 7–30)
CALCIUM SERPL-MCNC: 9.2 MG/DL (ref 8.5–10.1)
CHLORIDE BLD-SCNC: 103 MMOL/L (ref 94–109)
CO2 SERPL-SCNC: 26 MMOL/L (ref 20–32)
CREAT SERPL-MCNC: 0.49 MG/DL (ref 0.52–1.04)
CREAT UR-MCNC: 18 MG/DL
GFR SERPL CREATININE-BSD FRML MDRD: >90 ML/MIN/1.73M2
GLUCOSE BLD-MCNC: 150 MG/DL (ref 70–99)
MICROALBUMIN UR-MCNC: <5 MG/L
MICROALBUMIN/CREAT UR: NORMAL MG/G{CREAT}
POTASSIUM BLD-SCNC: 4 MMOL/L (ref 3.4–5.3)
PROT SERPL-MCNC: 8 G/DL (ref 6.8–8.8)
SODIUM SERPL-SCNC: 134 MMOL/L (ref 133–144)
TSH SERPL DL<=0.005 MIU/L-ACNC: 2.8 MU/L (ref 0.4–4)
URATE SERPL-MCNC: 2.2 MG/DL (ref 2.6–6)

## 2021-12-20 DIAGNOSIS — E11.65 TYPE 2 DIABETES MELLITUS WITH HYPERGLYCEMIA, WITHOUT LONG-TERM CURRENT USE OF INSULIN (H): Primary | ICD-10-CM

## 2021-12-21 LAB
CHOLEST SERPL-MCNC: 203 MG/DL
FASTING STATUS PATIENT QL REPORTED: YES
HDLC SERPL-MCNC: 33 MG/DL
LDLC SERPL CALC-MCNC: 108 MG/DL
NONHDLC SERPL-MCNC: 170 MG/DL
TRIGL SERPL-MCNC: 312 MG/DL

## 2021-12-24 ENCOUNTER — TELEPHONE (OUTPATIENT)
Dept: INTERNAL MEDICINE | Facility: CLINIC | Age: 41
End: 2021-12-24
Payer: COMMERCIAL

## 2021-12-24 NOTE — TELEPHONE ENCOUNTER
Diabetes Education Scheduling Outreach #1:    Call to patient to schedule. Left message with phone number to call to schedule.    Plan for 2nd outreach attempt within 1 week.    Roula Morrison  [place] [facility]  Diabetes and Nutrition Scheduling

## 2022-01-02 NOTE — TELEPHONE ENCOUNTER
Diabetes Education Scheduling Outreach #2:    [employer] message to patient requesting to call to schedule.    Roula Dave OnCall  Diabetes and Nutrition Scheduling

## 2022-01-24 DIAGNOSIS — E11.65 TYPE 2 DIABETES MELLITUS WITH HYPERGLYCEMIA, WITHOUT LONG-TERM CURRENT USE OF INSULIN (H): ICD-10-CM

## 2022-01-26 RX ORDER — GLIMEPIRIDE 4 MG/1
4 TABLET ORAL
Qty: 90 TABLET | Refills: 3 | OUTPATIENT
Start: 2022-01-26

## 2022-01-26 NOTE — TELEPHONE ENCOUNTER
Should have refills  E-Prescribing Status: Receipt confirmed by pharmacy (11/19/2021  3:38 PM CST)  Edith DOWELL RN, BSN

## 2022-02-13 ENCOUNTER — HEALTH MAINTENANCE LETTER (OUTPATIENT)
Age: 42
End: 2022-02-13

## 2022-03-25 ENCOUNTER — TELEPHONE (OUTPATIENT)
Dept: INTERNAL MEDICINE | Facility: CLINIC | Age: 42
End: 2022-03-25
Payer: COMMERCIAL

## 2022-03-25 DIAGNOSIS — Z11.1 SCREENING EXAMINATION FOR PULMONARY TUBERCULOSIS: Primary | ICD-10-CM

## 2022-03-25 NOTE — TELEPHONE ENCOUNTER
Please advise    Patient reports needing Mantoux test completed for employment purposes.   Order pended    Can we leave a detailed message on this number? YES  Phone number patient can be reached at: Home number on file [phone] (home)    Latoya Valle RN  [facility] Triage

## 2022-03-28 NOTE — TELEPHONE ENCOUNTER
Patient Contact    Attempt # 1    Was call answered?  No.  Left detailed message with below message. Advised to call clinic to set up appt.     Isidra Castellon RN

## 2022-03-29 ENCOUNTER — HOSPITAL ENCOUNTER (EMERGENCY)
Facility: CLINIC | Age: 42
Discharge: HOME | End: 2022-03-29
Attending: EMERGENCY MEDICINE | Admitting: EMERGENCY MEDICINE
Payer: COMMERCIAL

## 2022-03-29 ENCOUNTER — APPOINTMENT (OUTPATIENT)
Dept: MRI IMAGING | Facility: CLINIC | Age: 42
End: 2022-03-29
Attending: EMERGENCY MEDICINE
Payer: COMMERCIAL

## 2022-03-29 VITALS
DIASTOLIC BLOOD PRESSURE: 73 MMHG | SYSTOLIC BLOOD PRESSURE: 137 MMHG | OXYGEN SATURATION: 97 % | BODY MASS INDEX: 23.92 KG/M2 | HEIGHT: 62 IN | WEIGHT: 130 LBS | RESPIRATION RATE: 16 BRPM | HEART RATE: 82 BPM | TEMPERATURE: 98.1 F

## 2022-03-29 DIAGNOSIS — R68.89 COLD INTOLERANCE: ICD-10-CM

## 2022-03-29 DIAGNOSIS — Z86.018 HISTORY OF PITUITARY ADENOMA: ICD-10-CM

## 2022-03-29 DIAGNOSIS — R42 DIZZINESS: ICD-10-CM

## 2022-03-29 DIAGNOSIS — R51.9 NONINTRACTABLE HEADACHE, UNSPECIFIED CHRONICITY PATTERN, UNSPECIFIED HEADACHE TYPE: ICD-10-CM

## 2022-03-29 LAB
ALBUMIN SERPL-MCNC: 3.2 G/DL (ref 3.4–5)
ALP SERPL-CCNC: 82 U/L (ref 40–150)
ALT SERPL W P-5'-P-CCNC: 18 U/L (ref 0–50)
ANION GAP SERPL CALCULATED.3IONS-SCNC: 5 MMOL/L (ref 3–14)
AST SERPL W P-5'-P-CCNC: 16 U/L (ref 0–45)
ATRIAL RATE - MUSE: 82 BPM
BASOPHILS # BLD AUTO: 0.1 10E3/UL (ref 0–0.2)
BASOPHILS NFR BLD AUTO: 0 %
BILIRUB SERPL-MCNC: 0.3 MG/DL (ref 0.2–1.3)
BUN SERPL-MCNC: 7 MG/DL (ref 7–30)
CALCIUM SERPL-MCNC: 9 MG/DL (ref 8.5–10.1)
CHLORIDE BLD-SCNC: 109 MMOL/L (ref 94–109)
CO2 SERPL-SCNC: 26 MMOL/L (ref 20–32)
CREAT SERPL-MCNC: 0.52 MG/DL (ref 0.52–1.04)
DIASTOLIC BLOOD PRESSURE - MUSE: NORMAL MMHG
EOSINOPHIL # BLD AUTO: 0.2 10E3/UL (ref 0–0.7)
EOSINOPHIL NFR BLD AUTO: 1 %
ERYTHROCYTE [DISTWIDTH] IN BLOOD BY AUTOMATED COUNT: 13.2 % (ref 10–15)
GFR SERPL CREATININE-BSD FRML MDRD: >90 ML/MIN/1.73M2
GLUCOSE BLD-MCNC: 173 MG/DL (ref 70–99)
HCT VFR BLD AUTO: 40.4 % (ref 35–47)
HGB BLD-MCNC: 12.8 G/DL (ref 11.7–15.7)
IMM GRANULOCYTES # BLD: 0.1 10E3/UL
IMM GRANULOCYTES NFR BLD: 1 %
INTERPRETATION ECG - MUSE: NORMAL
LIPASE SERPL-CCNC: 137 U/L (ref 73–393)
LYMPHOCYTES # BLD AUTO: 4 10E3/UL (ref 0.8–5.3)
LYMPHOCYTES NFR BLD AUTO: 26 %
MCH RBC QN AUTO: 28.6 PG (ref 26.5–33)
MCHC RBC AUTO-ENTMCNC: 31.7 G/DL (ref 31.5–36.5)
MCV RBC AUTO: 90 FL (ref 78–100)
MONOCYTES # BLD AUTO: 0.9 10E3/UL (ref 0–1.3)
MONOCYTES NFR BLD AUTO: 6 %
NEUTROPHILS # BLD AUTO: 9.9 10E3/UL (ref 1.6–8.3)
NEUTROPHILS NFR BLD AUTO: 66 %
NRBC # BLD AUTO: 0 10E3/UL
NRBC BLD AUTO-RTO: 0 /100
P AXIS - MUSE: 34 DEGREES
PLATELET # BLD AUTO: 274 10E3/UL (ref 150–450)
POTASSIUM BLD-SCNC: 4.2 MMOL/L (ref 3.4–5.3)
PR INTERVAL - MUSE: 154 MS
PROT SERPL-MCNC: 7.1 G/DL (ref 6.8–8.8)
QRS DURATION - MUSE: 76 MS
QT - MUSE: 356 MS
QTC - MUSE: 415 MS
R AXIS - MUSE: 10 DEGREES
RBC # BLD AUTO: 4.47 10E6/UL (ref 3.8–5.2)
SODIUM SERPL-SCNC: 140 MMOL/L (ref 133–144)
SYSTOLIC BLOOD PRESSURE - MUSE: NORMAL MMHG
T AXIS - MUSE: 28 DEGREES
TROPONIN I SERPL HS-MCNC: 3 NG/L
TSH SERPL DL<=0.005 MIU/L-ACNC: 1.38 MU/L (ref 0.4–4)
VENTRICULAR RATE- MUSE: 82 BPM
WBC # BLD AUTO: 15.1 10E3/UL (ref 4–11)

## 2022-03-29 PROCEDURE — 83690 ASSAY OF LIPASE: CPT | Performed by: EMERGENCY MEDICINE

## 2022-03-29 PROCEDURE — 93005 ELECTROCARDIOGRAM TRACING: CPT

## 2022-03-29 PROCEDURE — 84443 ASSAY THYROID STIM HORMONE: CPT | Performed by: EMERGENCY MEDICINE

## 2022-03-29 PROCEDURE — A9585 GADOBUTROL INJECTION: HCPCS | Performed by: EMERGENCY MEDICINE

## 2022-03-29 PROCEDURE — 70543 MRI ORBT/FAC/NCK W/O &W/DYE: CPT

## 2022-03-29 PROCEDURE — 99285 EMERGENCY DEPT VISIT HI MDM: CPT | Mod: 25

## 2022-03-29 PROCEDURE — 85025 COMPLETE CBC W/AUTO DIFF WBC: CPT | Performed by: EMERGENCY MEDICINE

## 2022-03-29 PROCEDURE — 84484 ASSAY OF TROPONIN QUANT: CPT | Performed by: EMERGENCY MEDICINE

## 2022-03-29 PROCEDURE — 80053 COMPREHEN METABOLIC PANEL: CPT | Performed by: EMERGENCY MEDICINE

## 2022-03-29 PROCEDURE — 36415 COLL VENOUS BLD VENIPUNCTURE: CPT | Performed by: EMERGENCY MEDICINE

## 2022-03-29 PROCEDURE — 255N000002 HC RX 255 OP 636: Performed by: EMERGENCY MEDICINE

## 2022-03-29 RX ORDER — METOCLOPRAMIDE HYDROCHLORIDE 5 MG/ML
5 INJECTION INTRAMUSCULAR; INTRAVENOUS ONCE
Status: DISCONTINUED | OUTPATIENT
Start: 2022-03-29 | End: 2022-03-29 | Stop reason: HOSPADM

## 2022-03-29 RX ORDER — KETOROLAC TROMETHAMINE 15 MG/ML
15 INJECTION, SOLUTION INTRAMUSCULAR; INTRAVENOUS ONCE
Status: DISCONTINUED | OUTPATIENT
Start: 2022-03-29 | End: 2022-03-29 | Stop reason: HOSPADM

## 2022-03-29 RX ORDER — DIPHENHYDRAMINE HYDROCHLORIDE 50 MG/ML
25 INJECTION INTRAMUSCULAR; INTRAVENOUS ONCE
Status: DISCONTINUED | OUTPATIENT
Start: 2022-03-29 | End: 2022-03-29 | Stop reason: HOSPADM

## 2022-03-29 RX ORDER — GADOBUTROL 604.72 MG/ML
7 INJECTION INTRAVENOUS ONCE
Status: COMPLETED | OUTPATIENT
Start: 2022-03-29 | End: 2022-03-29

## 2022-03-29 RX ADMIN — GADOBUTROL 7 ML: 604.72 INJECTION INTRAVENOUS at 09:20

## 2022-03-29 NOTE — TELEPHONE ENCOUNTER
Called patient and scheduled patient for lab appt 4/18/22. Patient does not want to move her appt up and would like to keep it as scheduled for 4/18/22.     Cyndee Hill RN

## 2022-03-29 NOTE — TELEPHONE ENCOUNTER
Future order for Quantiferon gold signed.    This patient has not been seen face-to-face by any provider here for going on 2 years now.  I supposed you can use my same day next Thursday (4/7) at 3:30.

## 2022-03-29 NOTE — ED NOTES
Pt reports she is too cold to change out of clothes into hospital gown, pt provided with warm blankets.

## 2022-03-29 NOTE — ED TRIAGE NOTES
"\"Migraine started last night, dizzy, \"I feel like my pituitary tumor growing\" Pt agitated in triage  "

## 2022-03-29 NOTE — ED PROVIDER NOTES
"  History   Chief Complaint:  \"a laundry list\"    HPI  History supplemented by electronic chart review and mother at bedside  History somewhat limited as patient is a poor historian    Salima Woodward is a 42 year old female with history of pituitary microadenoma, type II diabetes, and hyperlipidemia who presents with a headache and multiple other symptoms. The patient reports a myriad of symptoms today, which she believes are related to her pituitary adenoma. She specifically requests that an MRI be performed.    Symptoms have included a headache, which began last night. Ibuprofen, which usually helps with migraines, has not been successful at controlling pain.  In her estimation she has had 4-5 migraines so far this year.  She also has been experiencing increased chills, lightheadedness, nausea, episodes of blurry vision, trouble with balance, and increased weakness in her knees. She states that the right side of her body feels stiff and sore. Her boyfriend noticed \"slurred speech\" this morning. Past issues that she attributes to her pituitary microadenoma have included decreased hormone levels and ceasing of her menstrual period. The patient denies vomiting.     The patient has not seen an endocrinologist for several years and she denies having a primary physician though she states she sees Dr. Montelongo for her diabetes.    Review of Systems   All other systems reviewed and are negative.    Allergies:  Bee Venom  Amoxicillin  Azithromycin  Metformin  No Clinical Screening - See Comments  Pioglitazone  Tetracyclines  Vicks Vaporub    Medications:  Glimepiride  Ozempic    Past Medical History:  Mantoux positive  Migraines  Pituitary microadenoma  Type II diabetes  Hyperlipidemia      Past Surgical History:    Colostomy     Family History:  Type II diabetes, mother  PCOS, mother  Type II diabetes, father     Social History:  Presents with mother  [facility] in home care   Visits with Dr. Holbrook for diabetes   Denies " "alcohol use     Physical Exam     Patient Vitals for the past 24 hrs:   BP Temp Temp src Pulse Resp SpO2 Height Weight   03/29/22 1015 137/73 -- -- 82 -- 97 % -- --   03/29/22 0901 -- -- -- -- -- 96 % -- --   03/29/22 0758 127/62 98.1  F (36.7  C) Temporal 96 16 -- 1.575 m (5' 2\") 59 kg (130 lb)     Physical Exam  General: Nontoxic-appearing woman sitting upright in room 26, covered in blankets, mother at bedside  HENT: mucous membranes moist, TMs clear, OP clear, face nontender  Eyes: PERRL, EOMI, no nystagmus, no proptosis, no apparent photophobia  CV: rate as above, regular rhythm, no murmur audible  Resp: normal effort, speaks in full phrases, no cough observed  GI: abdomen soft and nontender, no guarding  MSK: no bony tenderness, mastoids nontender  Skin: appropriately warm and dry, no facial rash  Neuro: awake, alert, clear speech, fully oriented, face symmetric,  normal, strength and sensation intact in all extr, no nuchal rigidity, ambulation not initially tested   Psych: anxious, no evidence of hallucinations    Emergency Department Course   ECG  ECG obtained at 0835, ECG read at 0841  Normal sinus rhythm  Normal ECG   Rate 82 bpm. KS interval 154 ms. QRS duration 76 ms. QT/QTc 356/415 ms. P-R-T axes 34 10 28.     Imaging:  MR Pituitary w and w/o contrast   Preliminary Result   IMPRESSION:   1. No acute intracranial process.   2. The previously seen hypoenhancing/nonenhancing right paramedian   pituitary hypoenhancing nodular lesion is not definitively identified   on this exam.        Report per radiology    Laboratory:  Labs Ordered and Resulted from Time of ED Arrival to Time of ED Departure   COMPREHENSIVE METABOLIC PANEL - Abnormal       Result Value    Sodium 140      Potassium 4.2      Chloride 109      Carbon Dioxide (CO2) 26      Anion Gap 5      Urea Nitrogen 7      Creatinine 0.52      Calcium 9.0      Glucose 173 (*)     Alkaline Phosphatase 82      AST 16      ALT 18      Protein Total 7.1 "      Albumin 3.2 (*)     Bilirubin Total 0.3      GFR Estimate >90     CBC WITH PLATELETS AND DIFFERENTIAL - Abnormal    WBC Count 15.1 (*)     RBC Count 4.47      Hemoglobin 12.8      Hematocrit 40.4      MCV 90      MCH 28.6      MCHC 31.7      RDW 13.2      Platelet Count 274      % Neutrophils 66      % Lymphocytes 26      % Monocytes 6      % Eosinophils 1      % Basophils 0      % Immature Granulocytes 1      NRBCs per 100 WBC 0      Absolute Neutrophils 9.9 (*)     Absolute Lymphocytes 4.0      Absolute Monocytes 0.9      Absolute Eosinophils 0.2      Absolute Basophils 0.1      Absolute Immature Granulocytes 0.1      Absolute NRBCs 0.0     LIPASE - Normal    Lipase 137     TROPONIN I - Normal    Troponin I High Sensitivity 3     TSH WITH FREE T4 REFLEX - Normal    TSH 1.38        Emergency Department Course:    Reviewed:  I reviewed nursing notes, vitals, past medical history and Care Everywhere    Assessments:  0809 I obtained history and examined the patient as noted above.   0823 I spoke with the MRI team.   1054 I rechecked the patient and explained findings.     Interventions:  Medications   gadobutrol (GADAVIST) injection 7 mL (7 mLs Intravenous Given 3/29/22 0920)     Disposition:  The patient was discharged to home.     Impression & Plan   Medical Decision Making:  She presents with a variety of symptoms including a headache, cold intolerance, vague dizziness and specific concern for her previously diagnosed pituitary tumor for which she has not had recent follow-up with neurology or endocrinology.  Vital signs are satisfactory and her physical exam is very reassuring.  She specifically desired an MRI of her brain, and in light of her presenting symptoms including some symptoms on the right side of her body, I did feel that it was reasonable to pursue MRI imaging.  I spoke with the MRI tech to ensure that this was protocoled to obtain adequate detail of the pituitary region in addition to the rest of  the brain to evaluate for the possibility of stroke, hemorrhage, tumor, and other conditions.  Interestingly, there is no definitive evidence of residual pituitary tumor at this time.  Blood counts are reassuring, noting mild nonspecific leukocytosis.  Presentation not suspicious for meningitis I do not think the blood cultures or spinal tap nor antibiotics are indicated at this time.  The diagnostic uncertainty and limitations of her assessment today were reviewed with her along with recommendation for close outpatient follow-up.  She is comfortable with this plan and was subsequently discharged home.  She declined any treatments here.  She did not wish to undergo pregnancy test, denying concern for the possibility of pregnancy.  She is welcome back for crisis at any hour.    Diagnosis:    ICD-10-CM    1. Nonintractable headache, unspecified chronicity pattern, unspecified headache type  R51.9    2. History of pituitary adenoma  Z86.018    3. Cold intolerance  R68.89    4. Dizziness  R42      Scribe Disclosure:  I, Caio Acuna, am serving as a scribe at 8:09 AM on 3/29/2022 to document services personally performed by Gavino Mclean MD based on my observations and the provider's statements to me.     This note was completed in part using Dragon voice recognition software. Although reviewed after completion, some word and grammatical errors may occur.           Gavino Mclean MD  03/29/22 2511

## 2022-03-29 NOTE — TELEPHONE ENCOUNTER
"Received a call from the patient stating she always reacts to the intradermal test for TB. Patient is requesting a blood test or chest x-ray order instead. Blood test pended.     Patient also states she went to the Emergency room this morning due to increased frequency in light headedness and migraines. Patient states she also has been having some mood changes, slurred speech, and has come close to fainting. Patient states the frequency of these symptoms has been increasing since January. Patient states they did do an MRI, EKG, and labs this morning. MD at emergency room informed patient she no longer has a pituitary tumor but patient feels like all these symptoms are similar to ones she has had in the past with her tumor present. Patient expressed frustration and \"wants some answer.\" Hospital F/U appointment scheduled for April 18, 2022 but patient is hoping to be seen earlier? Ok to take a same day slot sometime next week?    Routing to PCP for review.     Cyndee Hill RN    "

## 2022-03-31 ENCOUNTER — TELEPHONE (OUTPATIENT)
Dept: INTERNAL MEDICINE | Facility: CLINIC | Age: 42
End: 2022-03-31
Payer: COMMERCIAL

## 2022-03-31 NOTE — TELEPHONE ENCOUNTER
Patient Quality Outreach    Patient is due for the following:   Diabetes -  Eye Exam and Diabetic Follow-Up Visit-DM foot exam  Cervical Cancer Screening - PAP Needed  Physical  - with screening labs and PHQ 2  Immunizations  -  Covid and Influenza    NEXT STEPS:   Patient has upcoming appointment, these items will be addressed at that time.   4-18-22 with Dr. Holbrook    Type of outreach:    Chart review performed, no outreach needed.    Next Steps:  Reach out within 90 days via Phone, MyChart and Letter. If appt. not completed    Max number of attempts reached: No. Will try again in 90 days if patient still on fail list.    Questions for provider review:    None     Nilda Nguyen CMA  Chart routed to self.

## 2022-04-18 ENCOUNTER — VIRTUAL VISIT (OUTPATIENT)
Dept: INTERNAL MEDICINE | Facility: CLINIC | Age: 42
End: 2022-04-18
Payer: COMMERCIAL

## 2022-04-18 DIAGNOSIS — N91.2 AMENORRHEA: ICD-10-CM

## 2022-04-18 DIAGNOSIS — E11.65 TYPE 2 DIABETES MELLITUS WITH HYPERGLYCEMIA, WITHOUT LONG-TERM CURRENT USE OF INSULIN (H): ICD-10-CM

## 2022-04-18 DIAGNOSIS — Z86.018: Primary | ICD-10-CM

## 2022-04-18 PROCEDURE — 99213 OFFICE O/P EST LOW 20 MIN: CPT | Mod: 95 | Performed by: INTERNAL MEDICINE

## 2022-04-18 NOTE — PROGRESS NOTES
"Salima is a 42 year old who is being evaluated via a billable video visit.      How would you like to obtain your AVS? MyChart  If the video visit is dropped, the invitation should be resent by: text link to cell phone at [phone].  Will anyone else be joining your video visit? No    Video Start Time: 10:35 AM    Assessment & Plan     Type 2 diabetes mellitus with hyperglycemia, without long-term current use of insulin (H)  Re-check surveillance labs in near future  - Basic metabolic panel; Future  - Hemoglobin A1c; Future      History of benign neoplasm of pituitary gland  Re-check hormonal levels - per recent MRI the microadenoma previously present is no longer present.  - Follicle stimulating hormone; Future  - Luteinizing Hormone, Adult; Future  - Prolactin; Future  - Testosterone, total; Future  - Estradiol; Future    Amenorrhea  As above  - Follicle stimulating hormone; Future  - Luteinizing Hormone, Adult; Future  - Prolactin; Future  - Testosterone, total; Future  - Estradiol; Future            No follow-ups on file.    Samm Holbrook MD  [facility]   Salima is a 42 year old who presents for the following health issues     HPI     ED/UC Followup:    Facility:  [facility]  Date of visit: 03/29/2022 (4/14/2022 tested positive for covid)  Reason for visit: Headache (migraine)  Current Status: Pt is feeling drained, coughing, lightheaded spells, when taking ibuprofen she gets hot but, normal temp. Not much of an appetite.      Pt has had migraines intermittent since January.  seems like she cannot regulate her body temp either cold or wakes up drenched in sweat when having these \"migraine episodes\". When pt was in the ER she was told that she did not have a pituitary tumor it went a way and thyroid is small, discuss that MRI. Blood sugar has been alll over. 146-173 and down to 88 .        Review of Systems   Constitutional, HEENT, cardiovascular, pulmonary, " GI, , musculoskeletal, neuro, skin, endocrine and psych systems are negative, except as otherwise noted.      Objective    Vitals - Patient Reported  Systolic (Patient Reported): 112 (right arm)  Diastolic (Patient Reported): 65  Weight (Patient Reported): 59 kg (130 lb)  SpO2 (Patient Reported): 98  Temperature (Patient Reported): 96.4  F (35.8  C) (temporal)  Pulse (Patient Reported): 82      Vitals:  No vitals were obtained today due to virtual visit.    Physical Exam   GENERAL: Healthy, alert and no distress  EYES: Eyes grossly normal to inspection.  No discharge or erythema, or obvious scleral/conjunctival abnormalities.  RESP: No audible wheeze, cough, or visible cyanosis.  No visible retractions or increased work of breathing.    SKIN: Visible skin clear. No significant rash, abnormal pigmentation or lesions.  NEURO: Cranial nerves grossly intact.  Mentation and speech appropriate for age.  PSYCH: Mentation appears normal, affect normal/bright, judgement and insight intact, normal speech and appearance well-groomed.                Video-Visit Details    Type of service:  Video Visit    Video End Time:10:49 AM    Originating Location (pt. Location): Home    Distant Location (provider location):  [facility]     Platform used for Video Visit: MiteshWell

## 2022-04-27 NOTE — PROGRESS NOTES
"Called and spoke to the pharmacy staff as triage nurse received a message regarding an \"e-prescribing error\" for patient's omeprazole. Per pharmacy staff, medication not covered under insurance but patient has just been paying for medication out of pocket.     Cyndee Hill RN    "

## 2022-05-05 ENCOUNTER — LAB (OUTPATIENT)
Dept: LAB | Facility: CLINIC | Age: 42
End: 2022-05-05
Payer: COMMERCIAL

## 2022-05-05 DIAGNOSIS — N91.2 AMENORRHEA: ICD-10-CM

## 2022-05-05 DIAGNOSIS — Z11.1 SCREENING EXAMINATION FOR PULMONARY TUBERCULOSIS: ICD-10-CM

## 2022-05-05 DIAGNOSIS — Z86.018: ICD-10-CM

## 2022-05-05 DIAGNOSIS — E11.65 TYPE 2 DIABETES MELLITUS WITH HYPERGLYCEMIA, WITHOUT LONG-TERM CURRENT USE OF INSULIN (H): ICD-10-CM

## 2022-05-05 LAB
ANION GAP SERPL CALCULATED.3IONS-SCNC: 4 MMOL/L (ref 3–14)
BUN SERPL-MCNC: 11 MG/DL (ref 7–30)
CALCIUM SERPL-MCNC: 8.9 MG/DL (ref 8.5–10.1)
CHLORIDE BLD-SCNC: 108 MMOL/L (ref 94–109)
CO2 SERPL-SCNC: 26 MMOL/L (ref 20–32)
CREAT SERPL-MCNC: 0.49 MG/DL (ref 0.52–1.04)
ESTRADIOL SERPL-MCNC: 16 PG/ML
FSH SERPL-ACNC: 2 IU/L
GFR SERPL CREATININE-BSD FRML MDRD: >90 ML/MIN/1.73M2
GLUCOSE BLD-MCNC: 217 MG/DL (ref 70–99)
LH SERPL-ACNC: 0.8 IU/L
POTASSIUM BLD-SCNC: 4.1 MMOL/L (ref 3.4–5.3)
PROLACTIN SERPL-MCNC: 3 UG/L (ref 3–27)
SODIUM SERPL-SCNC: 138 MMOL/L (ref 133–144)

## 2022-05-05 PROCEDURE — 83002 ASSAY OF GONADOTROPIN (LH): CPT

## 2022-05-05 PROCEDURE — 83001 ASSAY OF GONADOTROPIN (FSH): CPT

## 2022-05-05 PROCEDURE — 82670 ASSAY OF TOTAL ESTRADIOL: CPT

## 2022-05-05 PROCEDURE — 86481 TB AG RESPONSE T-CELL SUSP: CPT

## 2022-05-05 PROCEDURE — 84146 ASSAY OF PROLACTIN: CPT

## 2022-05-05 PROCEDURE — 80048 BASIC METABOLIC PNL TOTAL CA: CPT

## 2022-05-05 PROCEDURE — 84403 ASSAY OF TOTAL TESTOSTERONE: CPT

## 2022-05-05 PROCEDURE — 36415 COLL VENOUS BLD VENIPUNCTURE: CPT

## 2022-05-06 LAB
QUANTIFERON MITOGEN: 10 IU/ML
QUANTIFERON NIL TUBE: 0.01 IU/ML
QUANTIFERON TB1 TUBE: 0.01 IU/ML
QUANTIFERON TB2 TUBE: 0.01

## 2022-05-07 LAB
GAMMA INTERFERON BACKGROUND BLD IA-ACNC: 0.01 IU/ML
M TB IFN-G BLD-IMP: NEGATIVE
M TB IFN-G CD4+ BCKGRND COR BLD-ACNC: 9.99 IU/ML
MITOGEN IGNF BCKGRD COR BLD-ACNC: 0 IU/ML
MITOGEN IGNF BCKGRD COR BLD-ACNC: 0 IU/ML
TESTOST SERPL-MCNC: 18 NG/DL (ref 8–60)

## 2022-07-31 ENCOUNTER — HEALTH MAINTENANCE LETTER (OUTPATIENT)
Age: 42
End: 2022-07-31

## 2022-09-12 DIAGNOSIS — E11.65 TYPE 2 DIABETES MELLITUS WITH HYPERGLYCEMIA, WITHOUT LONG-TERM CURRENT USE OF INSULIN (H): ICD-10-CM

## 2022-09-14 RX ORDER — SEMAGLUTIDE 1.34 MG/ML
1 INJECTION, SOLUTION SUBCUTANEOUS
Qty: 3 ML | Refills: 11 | Status: SHIPPED | OUTPATIENT
Start: 2022-09-14 | End: 2023-04-13

## 2022-09-14 NOTE — TELEPHONE ENCOUNTER
Routing refill request to provider for review/approval because:  Hemoglobin A1C   Date Value Ref Range Status   12/18/2021 9.9 (H) 0.0 - 5.6 % Final     Comment:     Normal <5.7%   Prediabetes 5.7-6.4%    Diabetes 6.5% or higher     Note: Adopted from ADA consensus guidelines.   10/08/2020 7.2 (H) 0 - 5.6 % Final     Comment:     Normal <5.7% Prediabetes 5.7-6.4%  Diabetes 6.5% or higher - adopted from ADA   consensus guidelines.       Creatinine   Date Value Ref Range Status   05/05/2022 0.49 (L) 0.52 - 1.04 mg/dL Final   02/23/2021 0.46 (L) 0.52 - 1.04 mg/dL Final         Soren Valera RN  [facility] Triage Nurse

## 2022-09-20 DIAGNOSIS — E11.65 TYPE 2 DIABETES MELLITUS WITH HYPERGLYCEMIA, WITHOUT LONG-TERM CURRENT USE OF INSULIN (H): ICD-10-CM

## 2022-09-21 NOTE — TELEPHONE ENCOUNTER
Routing refill request to provider for review/approval because:   Patient has documented A1c within the specified period of time.    Patient has a recent creatinine (normal) within the past 12 mos.    Recent (6 mo) or future (30 days) visit within the authorizing provider's specialty       Soren Valera RN  [facility] Triage Nurse

## 2022-09-22 RX ORDER — GLIMEPIRIDE 4 MG/1
4 TABLET ORAL
Qty: 90 TABLET | Refills: 3 | Status: SHIPPED | OUTPATIENT
Start: 2022-09-22 | End: 2023-04-13

## 2022-10-15 ENCOUNTER — HEALTH MAINTENANCE LETTER (OUTPATIENT)
Age: 42
End: 2022-10-15

## 2023-03-26 ENCOUNTER — HEALTH MAINTENANCE LETTER (OUTPATIENT)
Age: 43
End: 2023-03-26

## 2023-04-10 ENCOUNTER — MYC MEDICAL ADVICE (OUTPATIENT)
Dept: INTERNAL MEDICINE | Facility: CLINIC | Age: 43
End: 2023-04-10

## 2023-04-11 NOTE — TELEPHONE ENCOUNTER
"Patient informed that provider requesting in-person visit. Patient declined coming into clinic, stated she has a car appointment this morning and is unable to make it for in-person visit. Patient states she has met Dr. Maldonado in the past and does not intend on establishing care. Patient does not want one assigned primary, as she states \"I only come in once a year and only do labs once yearly\". Patient pays out of pocket for visits. Please advise if you are still willing to see patient virtually.   "

## 2023-04-13 ENCOUNTER — VIRTUAL VISIT (OUTPATIENT)
Dept: INTERNAL MEDICINE | Facility: CLINIC | Age: 43
End: 2023-04-13
Payer: COMMERCIAL

## 2023-04-13 DIAGNOSIS — E11.65 TYPE 2 DIABETES MELLITUS WITH HYPERGLYCEMIA, WITHOUT LONG-TERM CURRENT USE OF INSULIN (H): Primary | ICD-10-CM

## 2023-04-13 DIAGNOSIS — Z12.31 ENCOUNTER FOR SCREENING MAMMOGRAM FOR BREAST CANCER: ICD-10-CM

## 2023-04-13 PROCEDURE — 99213 OFFICE O/P EST LOW 20 MIN: CPT | Mod: VID | Performed by: INTERNAL MEDICINE

## 2023-04-13 RX ORDER — GLIMEPIRIDE 4 MG/1
8 TABLET ORAL
Qty: 180 TABLET | Refills: 0 | Status: SHIPPED | OUTPATIENT
Start: 2023-04-13 | End: 2023-07-04

## 2023-04-13 NOTE — PROGRESS NOTES
Salima is a 43 year old who is being evaluated via a billable video visit.       How would you like to obtain your AVS? StarriserharKnodium  If the video visit is dropped, the invitation should be resent by: Text to cell phone: [phone]  Will anyone else be joining your video visit? No             ASSESSMENT:    1. Type 2 diabetes mellitus with hyperglycemia, without long-term current use of insulin (H)  Previously uncontrolled.  Now off of Ozempic also slightly worsened from it.  Not checking blood sugars for couple months.  Glimepiride 8 mg daily and Will continue for some control.  Intolerant of metformin previously though may try again in the future with XR version to see if tolerates better as that would still be low and cost.  Patient states she is unable to afford brand medications such as the Ozempic or Farxiga/Jardiance and making too much money to get for free through pharmaceutical [employer].  Due for eye exam.  Counseled regarding carbohydrate reduction and increase walking exercise as able.  Patient states she now has better arch support in shoes  - Hemoglobin A1c; Future  - Comprehensive metabolic panel; Future  - Lipid panel reflex to direct LDL Fasting; Future  - Albumin Random Urine Quantitative with Creat Ratio; Future  - glimepiride (AMARYL) 4 MG tablet; Take 2 tablets (8 mg) by mouth every morning (before breakfast)  Dispense: 180 tablet; Refill: 0    2. Encounter for screening mammogram for breast cancer  Due for breast cancer screening  - *MA Screening Digital Bilateral; Future       PLAN:  Continue glimepiride 8 mg daily  Call  [phone] or use [employer] to schedule a future lab appointment  fasting in 1-2 weeks.   For fasting labs, please refrain from eating for 8 hours or more.   Drink 2 glasses of water before your lab appointment. It is fine to take your  oral medications on the morning of the lab test as usual  Schedule a follow up appointment with me in clinic in the next 90 days. Check blood  sugars twice a day (before breakfast and bedtime) for 4 days prior to the appointment with me, write them down and have them available to review with the appointment  Reduce calorie/carbohydrate (sugar, bread, potato, pasta, rice, alcohol etc)  intake in diet.  Increase color on your plate with vegetables. Increase  frequency of walking or other aerobic exercise as able (goal is daily)  Mammogram. This will be done at the [facility]. Call [phone] or use [employer] to schedule.    Schedule an appointment with [facility]. Call [phone] to schedule the  appointment for pap/pelvic exam  Schedule an eye exam appointment. Please ask the eye clinic to fax us a report of your eye exam to [phone], attention Dr Maldonado  Will review vaccinations also with patient when seen back in clinic              Video-Visit Details     Type of service:  Video Visit     Video Start Time: 8:28am     Video End Time:8:51am    Originating Location (pt. Location): Home     [place]  [facility]      Platform used for Video Visit: Munir Maldonado MD  Internal Medicine Department  [facility]  Internal Medicine Department      (Chart documentation was completed, in part, with Timeful voice-recognition software. Even though reviewed, some grammatical, spelling, and word errors may remain.)              Dalton Borrego is a 43 year old, presenting for the following health issues:     Most recent lab results reviewed with pt.       Component      Latest Ref Rng 10/8/2020 12/18/2021   Sodium      133 - 144 mmol/L   134    Potassium      3.4 - 5.3 mmol/L   4.0    Chloride      94 - 109 mmol/L   103    Carbon Dioxide      20 - 32 mmol/L   26    Anion Gap      3 - 14 mmol/L   5    Urea Nitrogen      7 - 30 mg/dL   6 (L)    Creatinine      0.52 - 1.04 mg/dL   0.49 (L)    Calcium      8.5 - 10.1 mg/dL   9.2     Glucose      70 - 99 mg/dL   150 (H)    Alkaline Phosphatase      40 - 150 U/L   100    AST      0 - 45 U/L   10    ALT      0 - 50 U/L   16    Protein Total      6.8 - 8.8 g/dL   8.0    Albumin      3.4 - 5.0 g/dL   3.6    Bilirubin Total      0.2 - 1.3 mg/dL   0.2    GFR Estimate      >60 mL/min/1.73m2   >90    Cholesterol      <200 mg/dL   203 (H)    Triglycerides      <150 mg/dL   312 (H)    HDL Cholesterol      >=50 mg/dL   33 (L)    LDL Cholesterol Calculated      <=100 mg/dL   108 (H)    Non HDL Cholesterol      <130 mg/dL   170 (H)    Patient Fasting > 8hrs?   Yes    Creatinine Urine      mg/dL   18    Albumin Urine mg/L      mg/L   <5    Albumin Urine mg/g Cr   --    Hemoglobin A1C      0.0 - 5.6 % 7.2 (H)  9.9 (H)    TSH      0.40 - 4.00 mU/L   2.80       Patient last seen by me in 2017.  Has not been seen in clinic since 2019.  Last virtual visit 1 year ago with other provider.  Not on statin therapy.  Overdue for eye exam.  Has astigmatism and some blurriness in vision occasionally.  Denies chest pain, shortness of breath, abdominal pain or numbness in extremities.  Patient states her weight has increased to 135 pounds.  Occasional plantar fascia pain.  Previously on Ozempic but medication too expensive for her.  States she has a $2400 deductible.  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 Has also looked into the option of getting meds from [facility] and still too expensive.  Moderate compliance with diabetic diet..  Tends to have had increased Posta at times  Has not been checking blood sugars for at least a couple months.  Was in the 200s prior to that.  Has been off of Ozempic since last December     Additional ROS:   Constitutional, HEENT, Cardiovascular, Pulmonary, GI and , Neuro, MSK and Psych review of systems/symptoms are otherwise negative or unchanged from previous, except as noted above.            Objective :  Patient reports weight of 135  pounds.  No other vitals to review     Physical Exam:  GENERAL: alert and no distress  EYES: Eyes grossly normal to inspection, conjunctivae and sclerae normal  RESP: no audible wheeze, cough, or visible cyanosis. No visible retractions or increased work of breathing. Able to speak fully in complete sentences.  NEURO: Cranial nerves grossly intact, mentation intact and speech normal  PSYCH: mentation appears normal, affect normal/bright, judgement and insight intact, normal speech and appearance well-groomed

## 2023-04-13 NOTE — PATIENT INSTRUCTIONS
Continue glimepiride 8 mg daily  Call  [phone] or use [employer] to schedule a future lab appointment  fasting in 1-2 weeks.   For fasting labs, please refrain from eating for 8 hours or more.   Drink 2 glasses of water before your lab appointment. It is fine to take your  oral medications on the morning of the lab test as usual  Schedule a follow up appointment with me in clinic in the next 90 days. Check blood sugars twice a day (before breakfast and bedtime) for 4 days prior to the appointment with me, write them down and have them available to review with the appointment  Reduce calorie/carbohydrate (sugar, bread, potato, pasta, rice, alcohol etc)  intake in diet.  Increase color on your plate with vegetables. Increase  frequency of walking or other aerobic exercise as able (goal is daily)  Mammogram. [facility]. Call [phone] or use [employer] to schedule.    Schedule an appointment with [facility]. Call [phone] to schedule the  appointment for pap/pelvic exam  Schedule an eye exam appointment. Please ask the eye clinic to fax us a report of your eye exam to [phone], attention Dr Maldonado  Will review vaccinations also with patient when seen back in clinic

## 2023-08-10 DIAGNOSIS — E11.65 TYPE 2 DIABETES MELLITUS WITH HYPERGLYCEMIA, WITHOUT LONG-TERM CURRENT USE OF INSULIN (H): ICD-10-CM

## 2023-08-10 RX ORDER — GLIMEPIRIDE 4 MG/1
TABLET ORAL
Qty: 60 TABLET | Refills: 2 | Status: SHIPPED | OUTPATIENT
Start: 2023-08-10 | End: 2023-10-11

## 2023-08-10 NOTE — TELEPHONE ENCOUNTER
Patient would like to go back on Ozempic, she said it really helped with her diabetes and she wants to see if insurance will cover it.  She also wants to know what labs she needs done.

## 2023-08-11 NOTE — TELEPHONE ENCOUNTER
Pt previously seen mostly by Dr Holbrook and Tiff Justice. I saw pt once in clinic in 2016 and once  with Virtual visit in April 2023 and was instructed to schedule fasting lab appt 1-2 weeks later and an appt with me in clinic within 90 days but never did either. Last labs 2021.  Cannot do PA potentially required for meds like Ozempic without knowing pt's diabetic control status, weight, etc. Pt now has appt for lab scheduled for 9/25/23 and appt with Dr Holbrook again 10/11/23. Glimepiride refilled for 3 months to cover pt some until seen by Dr Holbrook as scheduled and has labs before then and  she can discuss management with him further  at that time based on lab results, etc. Pt had also previously said meds like Ozempic too expensive for her  with high deductible at that time when seen virtually earlier this year

## 2023-09-27 ENCOUNTER — LAB (OUTPATIENT)
Dept: LAB | Facility: CLINIC | Age: 43
End: 2023-09-27
Payer: COMMERCIAL

## 2023-09-27 DIAGNOSIS — E11.65 TYPE 2 DIABETES MELLITUS WITH HYPERGLYCEMIA, WITHOUT LONG-TERM CURRENT USE OF INSULIN (H): ICD-10-CM

## 2023-09-27 LAB
ALBUMIN SERPL BCG-MCNC: 4.1 G/DL (ref 3.5–5.2)
ALP SERPL-CCNC: 105 U/L (ref 35–104)
ALT SERPL W P-5'-P-CCNC: 9 U/L (ref 0–50)
ANION GAP SERPL CALCULATED.3IONS-SCNC: 11 MMOL/L (ref 7–15)
AST SERPL W P-5'-P-CCNC: 15 U/L (ref 0–45)
BILIRUB SERPL-MCNC: 0.2 MG/DL
BUN SERPL-MCNC: 8.7 MG/DL (ref 6–20)
CALCIUM SERPL-MCNC: 9.4 MG/DL (ref 8.6–10)
CHLORIDE SERPL-SCNC: 100 MMOL/L (ref 98–107)
CHOLEST SERPL-MCNC: 231 MG/DL
CREAT SERPL-MCNC: 0.48 MG/DL (ref 0.51–0.95)
DEPRECATED HCO3 PLAS-SCNC: 27 MMOL/L (ref 22–29)
EGFRCR SERPLBLD CKD-EPI 2021: >90 ML/MIN/1.73M2
GLUCOSE SERPL-MCNC: 324 MG/DL (ref 70–99)
HBA1C MFR BLD: 12.7 % (ref 0–5.6)
HDLC SERPL-MCNC: 31 MG/DL
LDLC SERPL CALC-MCNC: ABNORMAL MG/DL
LDLC SERPL DIRECT ASSAY-MCNC: 137 MG/DL
NONHDLC SERPL-MCNC: 200 MG/DL
POTASSIUM SERPL-SCNC: 4.6 MMOL/L (ref 3.4–5.3)
PROT SERPL-MCNC: 7.3 G/DL (ref 6.4–8.3)
SODIUM SERPL-SCNC: 138 MMOL/L (ref 135–145)
TRIGL SERPL-MCNC: 422 MG/DL

## 2023-09-27 PROCEDURE — 80053 COMPREHEN METABOLIC PANEL: CPT

## 2023-09-27 PROCEDURE — 36415 COLL VENOUS BLD VENIPUNCTURE: CPT

## 2023-09-27 PROCEDURE — 83036 HEMOGLOBIN GLYCOSYLATED A1C: CPT

## 2023-09-27 PROCEDURE — 83721 ASSAY OF BLOOD LIPOPROTEIN: CPT | Mod: 59

## 2023-09-27 PROCEDURE — 80061 LIPID PANEL: CPT

## 2023-10-11 ENCOUNTER — OFFICE VISIT (OUTPATIENT)
Dept: INTERNAL MEDICINE | Facility: CLINIC | Age: 43
End: 2023-10-11
Payer: COMMERCIAL

## 2023-10-11 VITALS
BODY MASS INDEX: 24.64 KG/M2 | DIASTOLIC BLOOD PRESSURE: 60 MMHG | WEIGHT: 134.7 LBS | SYSTOLIC BLOOD PRESSURE: 90 MMHG | HEART RATE: 92 BPM | RESPIRATION RATE: 22 BRPM | OXYGEN SATURATION: 95 % | TEMPERATURE: 99.3 F

## 2023-10-11 DIAGNOSIS — E11.65 TYPE 2 DIABETES MELLITUS WITH HYPERGLYCEMIA, WITHOUT LONG-TERM CURRENT USE OF INSULIN (H): Primary | ICD-10-CM

## 2023-10-11 DIAGNOSIS — M79.674 PAIN OF TOE OF RIGHT FOOT: ICD-10-CM

## 2023-10-11 PROCEDURE — 99213 OFFICE O/P EST LOW 20 MIN: CPT | Performed by: INTERNAL MEDICINE

## 2023-10-11 RX ORDER — IBUPROFEN 800 MG/1
TABLET, FILM COATED ORAL
Qty: 90 TABLET | Refills: 1 | Status: SHIPPED | OUTPATIENT
Start: 2023-10-11

## 2023-10-11 RX ORDER — IBUPROFEN 800 MG/1
TABLET, FILM COATED ORAL
Qty: 90 TABLET | Refills: 1 | Status: CANCELLED | OUTPATIENT
Start: 2023-10-11

## 2023-10-11 RX ORDER — GLIMEPIRIDE 4 MG/1
TABLET ORAL
Qty: 60 TABLET | Refills: 2 | Status: SHIPPED | OUTPATIENT
Start: 2023-10-11 | End: 2024-02-12

## 2023-10-11 RX ORDER — LIRAGLUTIDE 6 MG/ML
0.6 INJECTION SUBCUTANEOUS DAILY
Qty: 3 ML | Refills: 11 | Status: SHIPPED | OUTPATIENT
Start: 2023-10-11

## 2023-10-11 NOTE — PROGRESS NOTES
Assessment & Plan       Pain of toe of right foot    - ibuprofen (ADVIL/MOTRIN) 800 MG tablet; TAKE 1 TABLET BY MOUTH EVERY 8 HOURS AS NEEDED FOR MODERATE PAIN    Type 2 diabetes mellitus with hyperglycemia, without long-term current use of insulin (H)  Try Victoza, would be surprised if this is covered 2.  If not, we will start Lantus insulin.  Reviewed briefly the usual Lantus/glargine titration schedule based on morning fasting sugars.  We will also start continuous glucose monitoring.  - glimepiride (AMARYL) 4 MG tablet; TAKE 2 TABLETS BY MOUTH IN THE MORNING BEFORE BREAKFAST  - liraglutide (VICTOZA) 18 MG/3ML solution; Inject 0.6 mg Subcutaneous daily  - Continuous Blood Gluc  (FREESTYLE JANE 2 READER) [facility]; Use to read blood sugars per 's instructions.  - Continuous Blood Gluc Sensor (FREESTYLE JANE 2 SENSOR) [facility]; Use 1 sensor every 14 days. Use to read blood sugars per 's instructions.             Nicotine/Tobacco Cessation:  She reports that she has been smoking cigarettes. She has been smoking an average of .5 packs per day. She has never used smokeless tobacco.  Nicotine/Tobacco Cessation Plan:   Information offered: Patient not interested at this time          Samm Holbrook MD  [facility]    Dalton Borrego is a 43 year old, presenting for the following health issues:  Diabetes      HPI           Diabetes Follow-up    How often are you checking your blood sugar? A few times a month  What time of day are you checking your blood sugars (select all that apply)?  Not applicable  Have you had any blood sugars above 200?  Yes   Have you had any blood sugars below 70?  No  What symptoms do you notice when your blood sugar is low?  Other: jittery  What concerns do you have today about your diabetes? None and Blood sugar is often over 200- medications are not affordable discuss cheaper options   Do you have any of these symptoms?  (Select all that apply)  No numbness or tingling in feet.  No redness, sores or blisters on feet.  No complaints of excessive thirst.  No reports of blurry vision.  No significant changes to weight.  Have you had a diabetic eye exam in the last 12 months? No    Pt to discuss middle of back pain (had a compresion fracture) and headaches  Discuss chantix would like more than 1 month worth  Having memory issues    BP Readings from Last 2 Encounters:   10/11/23 90/60   03/29/22 137/73     Hemoglobin A1C (%)   Date Value   09/27/2023 12.7 (H)   12/18/2021 9.9 (H)   10/08/2020 7.2 (H)   12/06/2019 6.6 (H)     LDL Cholesterol Calculated   Date Value   09/27/2023      Comment:     Cannot estimate LDL when triglyceride exceeds 400 mg/dL   12/18/2021 108 mg/dL (H)   10/08/2020 130 mg/dL (H)   05/31/2019     Cannot estimate LDL when triglyceride exceeds 400 mg/dL mg/dL     LDL Cholesterol Direct (mg/dL)   Date Value   09/27/2023 137 (H)   05/31/2019 119 (H)               Latest glycosylated hemoglobin up to 12.7.  Had good success in the past with Ozempic and glimepiride, Ozempic is now cost prohibitive.        Review of Systems   Constitutional, HEENT, cardiovascular, pulmonary, gi and gu systems are negative, except as otherwise noted.      Objective    BP 90/60 (BP Location: Left arm, Patient Position: Sitting, Cuff Size: Adult Regular)   Pulse 92   Temp 99.3  F (37.4  C) (Oral)   Resp 22   Wt 61.1 kg (134 lb 11.2 oz)   SpO2 95%   BMI 24.64 kg/m    Body mass index is 24.64 kg/m .  Physical Exam   GENERAL: healthy, alert and no distress  NECK: no adenopathy, no asymmetry, masses, or scars and thyroid normal to palpation  RESP: lungs clear to auscultation - no rales, rhonchi or wheezes  CV: regular rate and rhythm, normal S1 S2, no S3 or S4, no murmur, click or rub, no peripheral edema and peripheral pulses strong  ABDOMEN: soft, nontender, no hepatosplenomegaly, no masses and bowel sounds normal  MS: no gross  musculoskeletal defects noted, no edema

## 2023-10-17 ENCOUNTER — TELEPHONE (OUTPATIENT)
Dept: INTERNAL MEDICINE | Facility: CLINIC | Age: 43
End: 2023-10-17
Payer: COMMERCIAL

## 2023-10-17 DIAGNOSIS — E11.65 TYPE 2 DIABETES MELLITUS WITH HYPERGLYCEMIA, WITH LONG-TERM CURRENT USE OF INSULIN (H): Primary | ICD-10-CM

## 2023-10-17 DIAGNOSIS — Z79.4 TYPE 2 DIABETES MELLITUS WITH HYPERGLYCEMIA, WITH LONG-TERM CURRENT USE OF INSULIN (H): Primary | ICD-10-CM

## 2023-10-17 NOTE — TELEPHONE ENCOUNTER
"One of the \"glargine insulins\" (whether Lantus or Basaglar, etc) should be covered, I would think.  I'm going to try prescribing Lantus, hopefully the pharmacy will offer more affordable alternatives if Lantus isn't covered.    Sent prescriptions for Lantus and pen needles to her pharmacy.  I had reviewed the basic Lantus titration schedule with the patient - please reiterate this with her.  She should start at 10 units nightly, then go up by 2 units every three days, until her morning fasting sugars are consistently in the 150 range.  "

## 2023-10-18 NOTE — TELEPHONE ENCOUNTER
Patient Contact    Attempt # 1    Was call answered?  No.  Left message on voicemail with information to call me back.    On call back, please relay provider recommendations to patient.

## 2023-10-19 NOTE — TELEPHONE ENCOUNTER
"Is it possible to contact the pharmacy and see if glargine just dispensed in a vial (not a self-contained \"pen\") is also just as expensive?  "

## 2023-10-19 NOTE — TELEPHONE ENCOUNTER
Called and spoke with [facility] per provider request.     They stated:  Insulin glargine generic is fully covered for 5 pens  Box of 100 pen needles - $54 out of pocket, however the generic will be cheaper and pt would like these. Pharmacy is switching to the generic now.     Called and spoke with pt. She is agreeable to this plan.     Pharmacy did verify that Victoza is still not covered and will be unaffordable out-of-pocket for the pt.   Any alternative suggestions for this med needed at this time?    Thank you,  Altagracia Fritz, RN

## 2023-10-19 NOTE — TELEPHONE ENCOUNTER
"Called and spoke with pt to relay provider message below.     She states that Lantus is not covered and the pharmacy was unable to find any cheaper alternatives for her.   They suggested Levemir, which still costs over $1,000/mo since it's a tier 3 med.   She states she has tried [facility], and that did not help make it more affordable. She is very frustrated with this process and she states her blood sugars are \"out of control\".     She also states she has been unable to  liraglutide (VICTOZA) 18 MG/3ML solution since it is over $1,000/mo out of pocket.     Pt will call the pharmacy and see if they can assist her further.   Routing to PCP to advise any alternatives/next steps.     Can we leave a detailed message on this number? YES  Phone number patient can be reached at: Home number on file [phone] (home)    Altagracia Fritz, RN  [facility] Triage      "

## 2023-11-03 ENCOUNTER — TELEPHONE (OUTPATIENT)
Dept: INTERNAL MEDICINE | Facility: CLINIC | Age: 43
End: 2023-11-03
Payer: COMMERCIAL

## 2023-11-03 DIAGNOSIS — Z71.6 ENCOUNTER FOR TOBACCO USE CESSATION COUNSELING: Primary | ICD-10-CM

## 2023-11-03 RX ORDER — VARENICLINE TARTRATE 0.5 (11)-1
KIT ORAL
Qty: 53 TABLET | Refills: 0 | Status: SHIPPED | OUTPATIENT
Start: 2023-11-03

## 2023-11-03 RX ORDER — VARENICLINE TARTRATE 1 MG/1
1 TABLET, FILM COATED ORAL 2 TIMES DAILY
Qty: 60 TABLET | Refills: 1 | Status: SHIPPED | OUTPATIENT
Start: 2023-11-03

## 2023-11-03 NOTE — TELEPHONE ENCOUNTER
Patient is calling to state at her visit 10/11/2023, she was interested in Chantix. She does not know why the rooming staff documented she was not interested.      Nicotine/Tobacco Cessation:  She reports that she has been smoking cigarettes. She has been smoking an average of .5 packs per day. She has never used smokeless tobacco.  Nicotine/Tobacco Cessation Plan:   Information offered: Patient not interested at this time       If you agree to the medication, please call he Chantix medication to [facility] pharmacy .       Patient also is wondering why she would not titrate her insulin glargine at bedtime based on blood sugar level.       Chloé Rodriges RN  [facility]

## 2023-11-03 NOTE — TELEPHONE ENCOUNTER
She SHOULD titrate her insulin at bedtime, based on her morning fasting BGs, just like I had instructed her at the office visit.  The prescription may not spell this out, but that's what she should do.  She should increase by 2 U, every 3 days, until her morning fasting BGs are consistently in the 150 range or under.    Chantix scripts written

## 2023-11-03 NOTE — TELEPHONE ENCOUNTER
Patient Contact    Attempt # 1    Was call answered?  No.  Left message on voicemail with information to call me back.    Upon call back, please relay provider message below.     Thank you,  Altagracia Fritz RN

## 2023-11-07 NOTE — TELEPHONE ENCOUNTER
Pt returned call to clinic. Relayed provider message. Pt verbalizes understanding and agrees to plan of care.

## 2024-02-12 DIAGNOSIS — E11.65 TYPE 2 DIABETES MELLITUS WITH HYPERGLYCEMIA, WITHOUT LONG-TERM CURRENT USE OF INSULIN (H): ICD-10-CM

## 2024-02-12 RX ORDER — GLIMEPIRIDE 4 MG/1
TABLET ORAL
Qty: 60 TABLET | Refills: 0 | Status: SHIPPED | OUTPATIENT
Start: 2024-02-12 | End: 2024-03-21

## 2024-02-13 DIAGNOSIS — E11.65 TYPE 2 DIABETES MELLITUS WITH HYPERGLYCEMIA, WITH LONG-TERM CURRENT USE OF INSULIN (H): ICD-10-CM

## 2024-02-13 DIAGNOSIS — Z79.4 TYPE 2 DIABETES MELLITUS WITH HYPERGLYCEMIA, WITH LONG-TERM CURRENT USE OF INSULIN (H): ICD-10-CM

## 2024-03-17 ENCOUNTER — HEALTH MAINTENANCE LETTER (OUTPATIENT)
Age: 44
End: 2024-03-17

## 2024-03-21 DIAGNOSIS — Z79.4 TYPE 2 DIABETES MELLITUS WITH HYPERGLYCEMIA, WITH LONG-TERM CURRENT USE OF INSULIN (H): ICD-10-CM

## 2024-03-21 DIAGNOSIS — E11.65 TYPE 2 DIABETES MELLITUS WITH HYPERGLYCEMIA, WITHOUT LONG-TERM CURRENT USE OF INSULIN (H): ICD-10-CM

## 2024-03-21 DIAGNOSIS — E11.65 TYPE 2 DIABETES MELLITUS WITH HYPERGLYCEMIA, WITH LONG-TERM CURRENT USE OF INSULIN (H): ICD-10-CM

## 2024-03-21 RX ORDER — GLIMEPIRIDE 4 MG/1
TABLET ORAL
Qty: 60 TABLET | Refills: 0 | Status: SHIPPED | OUTPATIENT
Start: 2024-03-21 | End: 2024-05-20

## 2024-04-29 NOTE — TELEPHONE ENCOUNTER
"Called and spoke with pt. Pt stated she is taking 35u and still titrate up. Pt stated the script needs to say start at 35u and increase every \"x\" days by \"x\" units until BG is <150 in the AM.     Routing to PCP to review and advise.     Please call pt back with questions.   "

## 2024-04-29 NOTE — TELEPHONE ENCOUNTER
[facility] pharmacy needs an new Lantus prescription if pt dose has changed. Pt informed pharmacy she was  advised to tartrate dose. Pharmacy has Rx with directions to Inject 10 Units Subcutaneous at bedtime.     Triage, please find out what dose pt is taking and route to PCP. Thank you!    Per chart review, 11/03/23 TE :  Samm Holbrook MD     KW    11/3/23  2:40 PM  Note  She SHOULD titrate her insulin at bedtime, based on her morning fasting BGs, just like I had instructed her at the office visit.  The prescription may not spell this out, but that's what she should do.  She should increase by 2 U, every 3 days, until her morning fasting BGs are consistently in the 150 range or under.

## 2024-05-06 DIAGNOSIS — Z79.4 TYPE 2 DIABETES MELLITUS WITH HYPERGLYCEMIA, WITH LONG-TERM CURRENT USE OF INSULIN (H): ICD-10-CM

## 2024-05-06 DIAGNOSIS — E11.65 TYPE 2 DIABETES MELLITUS WITH HYPERGLYCEMIA, WITH LONG-TERM CURRENT USE OF INSULIN (H): ICD-10-CM

## 2024-05-06 RX ORDER — INSULIN GLARGINE-YFGN 100 [IU]/ML
35 INJECTION, SOLUTION SUBCUTANEOUS AT BEDTIME
Qty: 30 ML | Refills: 1 | Status: SHIPPED | OUTPATIENT
Start: 2024-05-06

## 2024-05-06 NOTE — TELEPHONE ENCOUNTER
See pharm note.and please carefully crosscheck    Notes here say pt should be taking 35 unit(s) of insulin glargine per pt report, but was signed again today for 10units.  Notes were buried down in encounter.       Should this pt be taking 10units or 35 units of glargine?  Pended as the 35

## 2024-05-06 NOTE — TELEPHONE ENCOUNTER
Pharmacist from [facility] pharmacy calling for updated Rx Lantus for patient.     Patient has informed pharmacy that she was advised to begin increasing dose and is currently taking   Lantus Pen 100 unit/ml:  Inject 30 units subcutaneous at hs.      Current Rx is for 10 units.     Please review and follow up with pharmacy.       Thank you,  Urszula PATEL, RN      May 6, 2024  11:08 AM

## 2024-05-07 ENCOUNTER — OFFICE VISIT (OUTPATIENT)
Dept: URGENT CARE | Facility: URGENT CARE | Age: 44
End: 2024-05-07
Payer: COMMERCIAL

## 2024-05-07 VITALS
WEIGHT: 135 LBS | BODY MASS INDEX: 24.69 KG/M2 | HEART RATE: 96 BPM | SYSTOLIC BLOOD PRESSURE: 124 MMHG | TEMPERATURE: 98.9 F | OXYGEN SATURATION: 97 % | DIASTOLIC BLOOD PRESSURE: 75 MMHG

## 2024-05-07 DIAGNOSIS — J01.90 ACUTE SINUSITIS WITH COEXISTING CONDITION REQUIRING PROPHYLACTIC TREATMENT: ICD-10-CM

## 2024-05-07 DIAGNOSIS — H66.002 ACUTE SUPPURATIVE OTITIS MEDIA OF LEFT EAR WITHOUT SPONTANEOUS RUPTURE OF TYMPANIC MEMBRANE, RECURRENCE NOT SPECIFIED: Primary | ICD-10-CM

## 2024-05-07 PROCEDURE — 99213 OFFICE O/P EST LOW 20 MIN: CPT | Performed by: FAMILY MEDICINE

## 2024-05-07 RX ORDER — CEFDINIR 300 MG/1
300 CAPSULE ORAL 2 TIMES DAILY
Qty: 20 CAPSULE | Refills: 0 | Status: SHIPPED | OUTPATIENT
Start: 2024-05-07 | End: 2024-05-17

## 2024-05-08 NOTE — PROGRESS NOTES
SUBJECTIVE: Salima Woodward is a 44 year old female presenting with a chief complaint of nasal congestion, ear pain left, and facial pain/pressure.  Onset of symptoms was day(s) ago.    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    negative CXR and Negative Quantiferon    Migraine     Pituitary microadenoma (H) 2005    per MRI    Type 2 diabetes, HbA1c goal < 7% (H) 1999     Allergies   Allergen Reactions    Bee Venom Anaphylaxis, Hives and Swelling     acute swelling in 1992    Amoxicillin GI Disturbance     Bloating, intense cramping  PN: LW Reaction: GI Upset    Azithromycin GI Disturbance     bloating  PN: LW Reaction: GI Upset    Metformin GI Disturbance    No Clinical Screening - See Comments      PN: LW Other1: -BEE STINGS    Other Food Allergy Dermatitis and Other (See Comments)    Pioglitazone GI Disturbance     Bloating, cramping with Actos    Tetracyclines & Related Hives    Vicks Vaporub Hives, Itching and Rash     Social History     Tobacco Use    Smoking status: Every Day     Current packs/day: 0.50     Types: Cigarettes    Smokeless tobacco: Never    Tobacco comments:     1/2 ppd since age 18   Substance Use Topics    Alcohol use: Yes     Comment: very seldom       ROS:  SKIN: no rash  GI: no vomiting    OBJECTIVE:  /75   Pulse 96   Temp 98.9  F (37.2  C) (Tympanic)   Wt 61.2 kg (135 lb)   SpO2 97%   BMI 24.69 kg/m  GENERAL APPEARANCE: healthy, alert and no distress  EYES: EOMI,  PERRL, conjunctiva clear  HENT: TM's normal bilaterally, rhinorrhea clear, oral mucous membranes moist, no erythema noted, and maxillary sinus tenderness   RESP: lungs clear to auscultation - no rales, rhonchi or wheezes  SKIN: no suspicious lesions or rashes      ICD-10-CM    1. Acute suppurative otitis media of left ear without spontaneous rupture of tympanic membrane, recurrence not specified  H66.002 cefdinir (OMNICEF) 300 MG capsule      2. Acute sinusitis with coexisting condition  requiring prophylactic treatment  J01.90 cefdinir (OMNICEF) 300 MG capsule          Fluids/Rest, f/u if worse/not any better

## 2024-05-20 DIAGNOSIS — E11.65 TYPE 2 DIABETES MELLITUS WITH HYPERGLYCEMIA, WITHOUT LONG-TERM CURRENT USE OF INSULIN (H): ICD-10-CM

## 2024-05-20 RX ORDER — GLIMEPIRIDE 4 MG/1
TABLET ORAL
Qty: 60 TABLET | Refills: 0 | Status: SHIPPED | OUTPATIENT
Start: 2024-05-20 | End: 2024-07-09

## 2024-05-26 ENCOUNTER — HEALTH MAINTENANCE LETTER (OUTPATIENT)
Age: 44
End: 2024-05-26

## 2024-07-04 ENCOUNTER — OFFICE VISIT (OUTPATIENT)
Dept: URGENT CARE | Facility: URGENT CARE | Age: 44
End: 2024-07-04
Payer: COMMERCIAL

## 2024-07-04 VITALS
HEART RATE: 56 BPM | OXYGEN SATURATION: 97 % | DIASTOLIC BLOOD PRESSURE: 76 MMHG | TEMPERATURE: 97.5 F | SYSTOLIC BLOOD PRESSURE: 121 MMHG | RESPIRATION RATE: 16 BRPM

## 2024-07-04 DIAGNOSIS — J02.9 SORE THROAT: ICD-10-CM

## 2024-07-04 DIAGNOSIS — J02.0 STREPTOCOCCAL SORE THROAT: Primary | ICD-10-CM

## 2024-07-04 LAB — DEPRECATED S PYO AG THROAT QL EIA: POSITIVE

## 2024-07-04 PROCEDURE — 99213 OFFICE O/P EST LOW 20 MIN: CPT | Performed by: INTERNAL MEDICINE

## 2024-07-04 PROCEDURE — 87880 STREP A ASSAY W/OPTIC: CPT | Performed by: INTERNAL MEDICINE

## 2024-07-04 RX ORDER — CEFDINIR 300 MG/1
300 CAPSULE ORAL 2 TIMES DAILY
Qty: 20 CAPSULE | Refills: 0 | Status: SHIPPED | OUTPATIENT
Start: 2024-07-04 | End: 2024-07-14

## 2024-07-04 ASSESSMENT — ENCOUNTER SYMPTOMS: FEVER: 0

## 2024-07-04 NOTE — PROGRESS NOTES
ASSESSMENT AND PLAN:      ICD-10-CM    1. Streptococcal sore throat  J02.0 cefdinir (OMNICEF) 300 MG capsule      2. Sore throat  J02.9 Streptococcus A Rapid Screen w/Reflex to PCR - Clinic Collect          PLAN:  URI Adult:  Tylenol, Ibuprofen, Fluids, Rest, and Saline gargles      Return if symptoms worsen or fail to improve.        Zuleyma Mcdaniel MD  [facility]    Subjective     Salima Woodward is a 44 year old who presents for Patient presents with:  Urgent Care: Cough, swollen glands, nasal congestion, post nasal drainage, sore throat x 2 days     an established patient of [facility].        Onset of symptoms was 2 day(s) ago.    Current and Associated symptoms: sore throat and glands swollen  Sinus congestion    Treatment measures tried include Tylenol/Ibuprofen  Predisposing factors include ill contact: unknown      Review of Systems   Constitutional:  Negative for fever.           Objective    /76   Pulse 56   Temp 97.5  F (36.4  C) (Tympanic)   Resp 16   SpO2 97%   Physical Exam  Vitals reviewed.   Constitutional:       Appearance: Normal appearance. She is ill-appearing. She is not toxic-appearing.   HENT:      Mouth/Throat:      Pharynx: Oropharyngeal exudate and posterior oropharyngeal erythema present.   Musculoskeletal:      Cervical back: Tenderness present.   Lymphadenopathy:      Cervical: Cervical adenopathy present.   Neurological:      Mental Status: She is alert.            Results for orders placed or performed in visit on 07/04/24 (from the past 24 hour(s))   Streptococcus A Rapid Screen w/Reflex to PCR - Clinic Collect    Specimen: Throat; Swab   Result Value Ref Range    Group A Strep antigen Positive (A) Negative

## 2024-07-09 DIAGNOSIS — E11.65 TYPE 2 DIABETES MELLITUS WITH HYPERGLYCEMIA, WITHOUT LONG-TERM CURRENT USE OF INSULIN (H): ICD-10-CM

## 2024-07-09 RX ORDER — GLIMEPIRIDE 4 MG/1
TABLET ORAL
Qty: 60 TABLET | Refills: 0 | Status: SHIPPED | OUTPATIENT
Start: 2024-07-09 | End: 2024-08-16

## 2024-08-13 DIAGNOSIS — E11.65 TYPE 2 DIABETES MELLITUS WITH HYPERGLYCEMIA, WITHOUT LONG-TERM CURRENT USE OF INSULIN (H): ICD-10-CM

## 2024-08-16 RX ORDER — GLIMEPIRIDE 4 MG/1
TABLET ORAL
Qty: 60 TABLET | Refills: 0 | Status: SHIPPED | OUTPATIENT
Start: 2024-08-16 | End: 2024-09-16

## 2024-09-14 DIAGNOSIS — E11.65 TYPE 2 DIABETES MELLITUS WITH HYPERGLYCEMIA, WITHOUT LONG-TERM CURRENT USE OF INSULIN (H): ICD-10-CM

## 2024-09-16 RX ORDER — GLIMEPIRIDE 4 MG/1
TABLET ORAL
Qty: 60 TABLET | Refills: 0 | Status: SHIPPED | OUTPATIENT
Start: 2024-09-16

## 2024-10-23 DIAGNOSIS — E11.65 TYPE 2 DIABETES MELLITUS WITH HYPERGLYCEMIA, WITHOUT LONG-TERM CURRENT USE OF INSULIN (H): ICD-10-CM

## 2024-10-23 RX ORDER — GLIMEPIRIDE 4 MG/1
TABLET ORAL
Qty: 60 TABLET | Refills: 0 | Status: SHIPPED | OUTPATIENT
Start: 2024-10-23

## 2024-12-18 DIAGNOSIS — E11.65 TYPE 2 DIABETES MELLITUS WITH HYPERGLYCEMIA, WITHOUT LONG-TERM CURRENT USE OF INSULIN (H): ICD-10-CM

## 2024-12-18 RX ORDER — GLIMEPIRIDE 4 MG/1
TABLET ORAL
Qty: 60 TABLET | Refills: 0 | Status: SHIPPED | OUTPATIENT
Start: 2024-12-18

## 2024-12-21 ENCOUNTER — HEALTH MAINTENANCE LETTER (OUTPATIENT)
Age: 44
End: 2024-12-21

## 2025-01-15 DIAGNOSIS — E11.65 TYPE 2 DIABETES MELLITUS WITH HYPERGLYCEMIA, WITHOUT LONG-TERM CURRENT USE OF INSULIN (H): ICD-10-CM

## 2025-01-15 RX ORDER — GLIMEPIRIDE 4 MG/1
TABLET ORAL
Qty: 60 TABLET | Refills: 0 | Status: SHIPPED | OUTPATIENT
Start: 2025-01-15

## 2025-02-18 DIAGNOSIS — E11.65 TYPE 2 DIABETES MELLITUS WITH HYPERGLYCEMIA, WITHOUT LONG-TERM CURRENT USE OF INSULIN (H): ICD-10-CM

## 2025-02-18 RX ORDER — GLIMEPIRIDE 4 MG/1
TABLET ORAL
Qty: 60 TABLET | Refills: 0 | Status: SHIPPED | OUTPATIENT
Start: 2025-02-18

## 2025-03-20 DIAGNOSIS — E11.65 TYPE 2 DIABETES MELLITUS WITH HYPERGLYCEMIA, WITHOUT LONG-TERM CURRENT USE OF INSULIN (H): ICD-10-CM

## 2025-03-20 RX ORDER — GLIMEPIRIDE 4 MG/1
TABLET ORAL
Qty: 60 TABLET | Refills: 0 | OUTPATIENT
Start: 2025-03-20

## 2025-04-23 ENCOUNTER — TELEPHONE (OUTPATIENT)
Dept: INTERNAL MEDICINE | Facility: CLINIC | Age: 45
End: 2025-04-23
Payer: COMMERCIAL

## 2025-04-23 NOTE — TELEPHONE ENCOUNTER
Reason for Call:  Appointment Request    Patient requesting this type of appt:  Office Visit     Requested provider: No Ref-Primary, Physician    Reason patient unable to be scheduled: Not within requested timeframe    When does patient want to be seen/preferred time: 1-2 weeks    Comments: Patient is trying to schedule her Diabetic follow up with Dr. Holbrook , but with her schedule she is needing an early appointment . I was unable to find her an early appointment so she is asking to be worked in    Could we send this information to you in [place] or would you prefer to receive a phone call?:   Patient would prefer a phone call   Okay to leave a detailed message?: Yes at Home number on file [phone] (home)    Call taken on 4/23/2025 at 7:47 AM by Essie Nicole

## 2025-05-19 DIAGNOSIS — E11.65 TYPE 2 DIABETES MELLITUS WITH HYPERGLYCEMIA, WITHOUT LONG-TERM CURRENT USE OF INSULIN (H): ICD-10-CM

## 2025-05-19 NOTE — TELEPHONE ENCOUNTER
Needs refills to get her by until 6/30 (this is the earliest appointment that works for the patient).

## 2025-05-21 RX ORDER — GLIMEPIRIDE 4 MG/1
TABLET ORAL
Qty: 60 TABLET | Refills: 0 | Status: SHIPPED | OUTPATIENT
Start: 2025-05-21

## 2025-06-14 ENCOUNTER — HEALTH MAINTENANCE LETTER (OUTPATIENT)
Age: 45
End: 2025-06-14

## 2025-06-30 ENCOUNTER — OFFICE VISIT (OUTPATIENT)
Dept: INTERNAL MEDICINE | Facility: CLINIC | Age: 45
End: 2025-06-30
Payer: COMMERCIAL

## 2025-06-30 VITALS
SYSTOLIC BLOOD PRESSURE: 126 MMHG | TEMPERATURE: 97.7 F | WEIGHT: 135 LBS | DIASTOLIC BLOOD PRESSURE: 64 MMHG | RESPIRATION RATE: 16 BRPM | BODY MASS INDEX: 24.84 KG/M2 | OXYGEN SATURATION: 96 % | HEART RATE: 90 BPM | HEIGHT: 62 IN

## 2025-06-30 DIAGNOSIS — E11.65 TYPE 2 DIABETES MELLITUS WITH HYPERGLYCEMIA, WITHOUT LONG-TERM CURRENT USE OF INSULIN (H): Primary | ICD-10-CM

## 2025-06-30 LAB
ALBUMIN SERPL BCG-MCNC: 3.8 G/DL (ref 3.5–5.2)
ALP SERPL-CCNC: 121 U/L (ref 40–150)
ALT SERPL W P-5'-P-CCNC: 15 U/L (ref 0–50)
ANION GAP SERPL CALCULATED.3IONS-SCNC: 12 MMOL/L (ref 7–15)
AST SERPL W P-5'-P-CCNC: 19 U/L (ref 0–45)
BILIRUB SERPL-MCNC: 0.2 MG/DL
BUN SERPL-MCNC: 15.9 MG/DL (ref 6–20)
CALCIUM SERPL-MCNC: 9.5 MG/DL (ref 8.8–10.4)
CHLORIDE SERPL-SCNC: 100 MMOL/L (ref 98–107)
CHOLEST SERPL-MCNC: 244 MG/DL
CREAT SERPL-MCNC: 0.49 MG/DL (ref 0.51–0.95)
EGFRCR SERPLBLD CKD-EPI 2021: >90 ML/MIN/1.73M2
EST. AVERAGE GLUCOSE BLD GHB EST-MCNC: 355 MG/DL
FASTING STATUS PATIENT QL REPORTED: YES
FASTING STATUS PATIENT QL REPORTED: YES
GLUCOSE SERPL-MCNC: 446 MG/DL (ref 70–99)
HBA1C MFR BLD: 14 % (ref 0–5.6)
HCO3 SERPL-SCNC: 23 MMOL/L (ref 22–29)
HDLC SERPL-MCNC: 28 MG/DL
LDLC SERPL CALC-MCNC: ABNORMAL MG/DL
LDLC SERPL DIRECT ASSAY-MCNC: 131 MG/DL
NONHDLC SERPL-MCNC: 216 MG/DL
POTASSIUM SERPL-SCNC: 4.8 MMOL/L (ref 3.4–5.3)
PROT SERPL-MCNC: 7.3 G/DL (ref 6.4–8.3)
SODIUM SERPL-SCNC: 135 MMOL/L (ref 135–145)
TRIGL SERPL-MCNC: 567 MG/DL
TSH SERPL DL<=0.005 MIU/L-ACNC: 2.11 UIU/ML (ref 0.3–4.2)

## 2025-06-30 PROCEDURE — 36415 COLL VENOUS BLD VENIPUNCTURE: CPT | Performed by: INTERNAL MEDICINE

## 2025-06-30 PROCEDURE — 3046F HEMOGLOBIN A1C LEVEL >9.0%: CPT | Performed by: INTERNAL MEDICINE

## 2025-06-30 PROCEDURE — 83721 ASSAY OF BLOOD LIPOPROTEIN: CPT | Mod: 59 | Performed by: INTERNAL MEDICINE

## 2025-06-30 PROCEDURE — 3078F DIAST BP <80 MM HG: CPT | Performed by: INTERNAL MEDICINE

## 2025-06-30 PROCEDURE — 80061 LIPID PANEL: CPT | Performed by: INTERNAL MEDICINE

## 2025-06-30 PROCEDURE — 84443 ASSAY THYROID STIM HORMONE: CPT | Performed by: INTERNAL MEDICINE

## 2025-06-30 PROCEDURE — 83036 HEMOGLOBIN GLYCOSYLATED A1C: CPT | Performed by: INTERNAL MEDICINE

## 2025-06-30 PROCEDURE — G2211 COMPLEX E/M VISIT ADD ON: HCPCS | Performed by: INTERNAL MEDICINE

## 2025-06-30 PROCEDURE — 80053 COMPREHEN METABOLIC PANEL: CPT | Performed by: INTERNAL MEDICINE

## 2025-06-30 PROCEDURE — 3074F SYST BP LT 130 MM HG: CPT | Performed by: INTERNAL MEDICINE

## 2025-06-30 PROCEDURE — 99214 OFFICE O/P EST MOD 30 MIN: CPT | Performed by: INTERNAL MEDICINE

## 2025-06-30 RX ORDER — GLIMEPIRIDE 4 MG/1
TABLET ORAL
Qty: 30 TABLET | Refills: 11 | Status: SHIPPED | OUTPATIENT
Start: 2025-06-30

## 2025-06-30 NOTE — PROGRESS NOTES
Assessment & Plan     Type 2 diabetes mellitus with hyperglycemia, without long-term current use of insulin (H)  Recheck surveillance labs today, likely will be significantly abnormal.  Restart low-dose Ozempic, needs to start following up regularly, will arrange for virtual follow-up in 1 month.  - HEMOGLOBIN A1C; Future  - Lipid panel reflex to direct LDL Fasting; Future  - Albumin Random Urine Quantitative with Creat Ratio; Future  - Comprehensive metabolic panel (BMP + Alb, Alk Phos, ALT, AST, Total. Bili, TP); Future  - TSH with free T4 reflex; Future  - semaglutide (OZEMPIC) 2 MG/3ML pen; Inject 0.25 mg subcutaneously every 7 days.  - glimepiride (AMARYL) 4 MG tablet; TAKE 2 TABLETS BY MOUTH IN THE MORNING BEFORE BREAKFAST  - HEMOGLOBIN A1C  - Lipid panel reflex to direct LDL Fasting  - Comprehensive metabolic panel (BMP + Alb, Alk Phos, ALT, AST, Total. Bili, TP)  - TSH with free T4 reflex          Nicotine/Tobacco Cessation  She reports that she has been smoking cigarettes. She has never used smokeless tobacco.  Nicotine/Tobacco Cessation Plan  Information offered: Patient not interested at this time            Dalton Borrego is a 45 year old, presenting for the following health issues:  Diabetes    History of Present Illness       Diabetes:   She presents for follow up of diabetes.    She is not checking blood glucose.        She is concerned about blood sugar frequently over 200.    She is not experiencing numbness or burning in feet, excessive thirst, blurry vision, weight changes or redness, sores or blisters on feet. The patient has not had a diabetic eye exam in the last 12 months.          She eats 2-3 servings of fruits and vegetables daily.She consumes 0 sweetened beverage(s) daily.           Has not followed up here since October 2023.  At that time glycosylated hemoglobin was over 12, we had discussed starting Lantus insulin if the GLP-1 agonist prescribed at that time was not covered,  "and I have not heard from her since.  She states that current insurance will cover Ozempic.    She checks her blood sugar sparingly, at times it is over 400.  She continues on glimepiride at 8 mg daily.  Long overdue for any sort of lab follow-up.                Review of Systems  Constitutional, HEENT, cardiovascular, pulmonary, gi and gu systems are negative, except as otherwise noted.      Objective    /64   Pulse 90   Temp 97.7  F (36.5  C) (Temporal)   Resp 16   Ht 1.575 m (5' 2\")   Wt 61.2 kg (135 lb)   SpO2 96%   BMI 24.69 kg/m    Body mass index is 24.69 kg/m .  Physical Exam   GENERAL: alert and no distress              Signed Electronically by: Samm Holbrook MD    "

## 2025-07-02 ENCOUNTER — RESULTS FOLLOW-UP (OUTPATIENT)
Dept: INTERNAL MEDICINE | Facility: CLINIC | Age: 45
End: 2025-07-02

## 2025-07-03 ENCOUNTER — PATIENT OUTREACH (OUTPATIENT)
Dept: CARE COORDINATION | Facility: CLINIC | Age: 45
End: 2025-07-03
Payer: COMMERCIAL